# Patient Record
Sex: FEMALE | Race: WHITE | NOT HISPANIC OR LATINO | Employment: FULL TIME | URBAN - METROPOLITAN AREA
[De-identification: names, ages, dates, MRNs, and addresses within clinical notes are randomized per-mention and may not be internally consistent; named-entity substitution may affect disease eponyms.]

---

## 2017-06-22 ENCOUNTER — CONVERSION ENCOUNTER (OUTPATIENT)
Dept: GENERAL RADIOLOGY | Facility: HOSPITAL | Age: 42
End: 2017-06-22

## 2018-02-06 ENCOUNTER — OFFICE VISIT CONVERTED (OUTPATIENT)
Dept: FAMILY MEDICINE CLINIC | Facility: CLINIC | Age: 43
End: 2018-02-06
Attending: NURSE PRACTITIONER

## 2018-05-22 ENCOUNTER — CONVERSION ENCOUNTER (OUTPATIENT)
Dept: FAMILY MEDICINE CLINIC | Facility: CLINIC | Age: 43
End: 2018-05-22

## 2018-05-22 ENCOUNTER — OFFICE VISIT CONVERTED (OUTPATIENT)
Dept: FAMILY MEDICINE CLINIC | Facility: CLINIC | Age: 43
End: 2018-05-22
Attending: NURSE PRACTITIONER

## 2018-09-20 ENCOUNTER — OFFICE VISIT CONVERTED (OUTPATIENT)
Dept: ORTHOPEDIC SURGERY | Facility: CLINIC | Age: 43
End: 2018-09-20
Attending: PHYSICIAN ASSISTANT

## 2018-10-03 ENCOUNTER — OFFICE VISIT CONVERTED (OUTPATIENT)
Dept: NEUROLOGY | Facility: CLINIC | Age: 43
End: 2018-10-03
Attending: PSYCHIATRY & NEUROLOGY

## 2018-10-10 ENCOUNTER — CONVERSION ENCOUNTER (OUTPATIENT)
Dept: OTHER | Facility: HOSPITAL | Age: 43
End: 2018-10-10

## 2018-10-10 ENCOUNTER — OFFICE VISIT CONVERTED (OUTPATIENT)
Dept: OTHER | Facility: HOSPITAL | Age: 43
End: 2018-10-10
Attending: ORTHOPAEDIC SURGERY

## 2018-10-29 ENCOUNTER — OFFICE VISIT CONVERTED (OUTPATIENT)
Dept: ORTHOPEDIC SURGERY | Facility: CLINIC | Age: 43
End: 2018-10-29
Attending: PHYSICIAN ASSISTANT

## 2018-11-13 ENCOUNTER — OFFICE VISIT CONVERTED (OUTPATIENT)
Dept: ORTHOPEDIC SURGERY | Facility: CLINIC | Age: 43
End: 2018-11-13
Attending: PHYSICIAN ASSISTANT

## 2018-11-19 ENCOUNTER — OFFICE VISIT CONVERTED (OUTPATIENT)
Dept: FAMILY MEDICINE CLINIC | Facility: CLINIC | Age: 43
End: 2018-11-19
Attending: NURSE PRACTITIONER

## 2018-12-13 ENCOUNTER — OFFICE VISIT CONVERTED (OUTPATIENT)
Dept: ORTHOPEDIC SURGERY | Facility: CLINIC | Age: 43
End: 2018-12-13
Attending: PHYSICIAN ASSISTANT

## 2019-01-15 ENCOUNTER — OFFICE VISIT CONVERTED (OUTPATIENT)
Dept: ORTHOPEDIC SURGERY | Facility: CLINIC | Age: 44
End: 2019-01-15
Attending: ORTHOPAEDIC SURGERY

## 2019-01-15 ENCOUNTER — CONVERSION ENCOUNTER (OUTPATIENT)
Dept: ORTHOPEDIC SURGERY | Facility: CLINIC | Age: 44
End: 2019-01-15

## 2019-01-29 ENCOUNTER — OFFICE VISIT CONVERTED (OUTPATIENT)
Dept: FAMILY MEDICINE CLINIC | Facility: CLINIC | Age: 44
End: 2019-01-29
Attending: NURSE PRACTITIONER

## 2019-04-19 ENCOUNTER — CONVERSION ENCOUNTER (OUTPATIENT)
Dept: FAMILY MEDICINE CLINIC | Facility: CLINIC | Age: 44
End: 2019-04-19

## 2019-04-19 ENCOUNTER — OFFICE VISIT CONVERTED (OUTPATIENT)
Dept: FAMILY MEDICINE CLINIC | Facility: CLINIC | Age: 44
End: 2019-04-19
Attending: NURSE PRACTITIONER

## 2019-05-03 ENCOUNTER — CONVERSION ENCOUNTER (OUTPATIENT)
Dept: FAMILY MEDICINE CLINIC | Facility: CLINIC | Age: 44
End: 2019-05-03
Attending: NURSE PRACTITIONER

## 2019-05-03 ENCOUNTER — HOSPITAL ENCOUNTER (OUTPATIENT)
Dept: GENERAL RADIOLOGY | Facility: HOSPITAL | Age: 44
Discharge: HOME | End: 2019-05-03
Attending: NURSE PRACTITIONER

## 2019-05-17 ENCOUNTER — OFFICE VISIT CONVERTED (OUTPATIENT)
Dept: FAMILY MEDICINE CLINIC | Facility: CLINIC | Age: 44
End: 2019-05-17
Attending: NURSE PRACTITIONER

## 2019-07-12 ENCOUNTER — OFFICE VISIT CONVERTED (OUTPATIENT)
Dept: FAMILY MEDICINE CLINIC | Facility: CLINIC | Age: 44
End: 2019-07-12
Attending: NURSE PRACTITIONER

## 2019-07-12 ENCOUNTER — CONVERSION ENCOUNTER (OUTPATIENT)
Dept: FAMILY MEDICINE CLINIC | Facility: CLINIC | Age: 44
End: 2019-07-12

## 2019-08-14 ENCOUNTER — OFFICE VISIT CONVERTED (OUTPATIENT)
Dept: FAMILY MEDICINE CLINIC | Facility: CLINIC | Age: 44
End: 2019-08-14
Attending: NURSE PRACTITIONER

## 2019-08-23 ENCOUNTER — OFFICE VISIT CONVERTED (OUTPATIENT)
Dept: FAMILY MEDICINE CLINIC | Facility: CLINIC | Age: 44
End: 2019-08-23
Attending: NURSE PRACTITIONER

## 2020-03-25 ENCOUNTER — OFFICE VISIT CONVERTED (OUTPATIENT)
Dept: FAMILY MEDICINE CLINIC | Facility: CLINIC | Age: 45
End: 2020-03-25
Attending: NURSE PRACTITIONER

## 2020-05-06 ENCOUNTER — OFFICE VISIT CONVERTED (OUTPATIENT)
Dept: FAMILY MEDICINE CLINIC | Facility: CLINIC | Age: 45
End: 2020-05-06
Attending: NURSE PRACTITIONER

## 2020-05-06 ENCOUNTER — CONVERSION ENCOUNTER (OUTPATIENT)
Dept: FAMILY MEDICINE CLINIC | Facility: CLINIC | Age: 45
End: 2020-05-06

## 2020-07-20 ENCOUNTER — OFFICE VISIT CONVERTED (OUTPATIENT)
Dept: FAMILY MEDICINE CLINIC | Facility: CLINIC | Age: 45
End: 2020-07-20
Attending: NURSE PRACTITIONER

## 2020-08-01 ENCOUNTER — HOSPITAL ENCOUNTER (OUTPATIENT)
Dept: OTHER | Facility: HOSPITAL | Age: 45
Discharge: HOME | End: 2020-08-01
Attending: NURSE PRACTITIONER

## 2020-08-01 LAB
CRP SERPL-MCNC: 5.2 MG/L (ref 0–5)
ERYTHROCYTE [SEDIMENTATION RATE] IN BLOOD: 9 MM/H (ref 0–20)

## 2020-08-07 ENCOUNTER — OFFICE VISIT CONVERTED (OUTPATIENT)
Dept: FAMILY MEDICINE CLINIC | Facility: CLINIC | Age: 45
End: 2020-08-07
Attending: NURSE PRACTITIONER

## 2020-08-07 ENCOUNTER — CONVERSION ENCOUNTER (OUTPATIENT)
Dept: FAMILY MEDICINE CLINIC | Facility: CLINIC | Age: 45
End: 2020-08-07

## 2020-08-07 LAB
CONV RHEUMATOID FACTOR IGA: 3 UNITS (ref 0–6)
CONV RHEUMATOID FACTOR IGG: 5 UNITS (ref 0–6)
CONV RHEUMATOID FACTOR IGM: 4 UNITS (ref 0–6)

## 2020-08-21 ENCOUNTER — CONVERSION ENCOUNTER (OUTPATIENT)
Dept: FAMILY MEDICINE CLINIC | Facility: CLINIC | Age: 45
End: 2020-08-21

## 2020-08-21 ENCOUNTER — OFFICE VISIT CONVERTED (OUTPATIENT)
Dept: FAMILY MEDICINE CLINIC | Facility: CLINIC | Age: 45
End: 2020-08-21
Attending: NURSE PRACTITIONER

## 2021-05-13 NOTE — PROGRESS NOTES
Progress Note      Patient Name: Odessa Thomas   Patient ID: [id number]   Sex: Female   YOB: 1975    Primary Care Provider: Christian CHOWDHURY   Referring Provider: Christian CHOWDHURY    Visit Date: May 6, 2020    Provider: TRENTON Brady   Location: [facility]   Location Address: [address] [place] [address], [address]  [id number]   Location Phone: [phone]          Chief Complaint  · headache      History Of Present Illness  Odessa Thomas is a 44 year old /White female who presents for evaluation and treatment of:      Patient presents to the office today with complaints of frequent headaches.  She states that these headaches are different than her migraines as they feel as if they originate in her neck and work their way to the back of her head.  Patient does state that it feels as if there is a band around her head.  Patient does state that she has started a new job approximately 6 weeks ago and is having to learn a lot of new things and she is putting a lot of pressure on herself.  Patient states that she is working 8 to 10 hours a day and then going home and studying for multiple hours on the [employer].       Past Medical History  Arthropathy, unspecified; Bilateral carpal tunnel syndrome; Bilateral wrist pain; Carpal tunnel syndrome of left wrist; Carpal tunnel syndrome of right wrist; Cervical pain (neck); Cervicalgia; Cubital tunnel syndrome on left; Fatigue; Lateral epicondylitis of left elbow; Lumbago/low back pain; Migraine, unspecified; Pain management; Right knee pain; Screening Mammogram         Past Surgical History  Arm Surgery; Bladder Surgery; Carpal Tunnel Release; Cesarian Section; Partial Hysterectomy         Medication List  azelastine 137 mcg (0.1 %) nasal aerosol,spray; Florastor 250 mg oral capsule; metoprolol succinate 25 mg oral tablet extended release 24 hr; Neuropathic Compound Cream Apply 1-2 gm, 3-4 times a day; Synthroid 25 mcg oral  "tablet; Topamax 50 mg oral tablet; trazodone 50 mg oral tablet; Xyzal 5 mg oral tablet         Allergy List  Steroids         Family Medical History  Stroke; Rheumatoid arthritis; Heart Attack (MI)         Social History  Alcohol (Never); Tobacco (Current every day)         Review of Systems  · Constitutional  o Denies  o : fever, fatigue, weight loss, weight gain  · HENT  o Admits  o : headaches  · Cardiovascular  o Denies  o : lower extremity edema, claudication, chest pressure, palpitations  · Respiratory  o Denies  o : shortness of breath, wheezing, cough, hemoptysis, dyspnea on exertion  · Gastrointestinal  o Denies  o : nausea, vomiting, diarrhea, constipation, abdominal pain      Vitals  Date Time BP Position Site L\R Cuff Size HR RR TEMP (F) WT  HT  BMI kg/m2 BSA m2 O2 Sat HC       05/06/2020 02:07 /72 Sitting    114 - R 16 98 131lbs 0oz 5'  1\" 24.75 1.6 98 %          Physical Examination  · Constitutional  o Appearance  o : well-nourished, in no acute distress  · Neck  o Inspection/Palpation  o : normal appearance, no masses or tenderness, trachea midline  o Range of Motion  o : cervical range of motion within normal limits  o Thyroid  o : gland size normal, nontender, no nodules or masses present on palpation  · Respiratory  o Respiratory Effort  o : breathing unlabored  o Inspection of Chest  o : normal appearance  o Auscultation of Lungs  o : normal breath sounds throughout inspiration and expiration  · Cardiovascular  o Heart  o :   § Auscultation of Heart  § : regular rate and rhythm, no murmurs, gallops or rubs  o Peripheral Vascular System  o :   § Extremities  § : no clubbing or edema  · Skin and Subcutaneous Tissue  o General Inspection  o : no rashes or lesions present, no areas of discoloration  o Body Hair  o : hair normal for age, general body hair distribution normal for age  o Digits and Nails  o : no clubbing, cyanosis, deformities or edema present, normal appearing " nails  · Neurologic  o Mental Status Examination  o :   § Orientation  § : grossly oriented to person, place and time  o Gait and Station  o : normal gait, able to stand without difficulty  · Psychiatric  o Judgement and Insight  o : judgment and insight intact  o Mood and Affect  o : mood normal, affect appropriate  o Presence of Abnormal Thoughts  o : no hallucinations, no delusions present, no psychotic thoughts          Assessment  · Screening for depression     V79.0/Z13.89  · Anxiety disorder     300.00/F41.9  · Headache     784.0/R51      Plan  · Orders  o Annual depression screening, 15 minutes (, 34200) - V79.0/Z13.89 - 05/06/2020  o ACO-18: Negative screen for clinical depression using a standardized tool () - V79.0/Z13.89 - 05/06/2020  o ACO-39: Current medications updated and reviewed () - - 05/06/2020  o ACO-14: Influenza immunization was not administered for reasons documented () - - 05/06/2020  · Medications  o Wellbutrin  mg oral tablet extended release 24 hr   SIG: take 1 tablet (150 mg) by oral route once daily   DISP: (30) tablets with 5 refills  Prescribed on 05/06/2020     o Synthroid 25 mcg oral tablet   SIG: TAKE 1 TABLET BY MOUTH EVERY DAY   DISP: (30) Tablet with 5 refills  Discontinued on 05/06/2020     o Topamax 50 mg oral tablet   SIG: Take 0.5 tab PO qhs x 1 week then 1 tab PO BID thereafter   DISP: (60) tablets with 1 refills  Discontinued on 05/06/2020     o trazodone 50 mg oral tablet   SIG: TAKE 1 TABLET (50 MG) BY ORAL ROUTE ONCE DAILY AT BEDTIME   DISP: (30) Tablet with 2 refills  Discontinued on 05/06/2020     o Xyzal 5 mg oral tablet   SIG: take 1 tablet (5 mg) by oral route once daily at bedtime   DISP: (30) tablets with 0 refills  Discontinued on 05/06/2020     o Medications have been Reconciled  o Transition of Care or Provider Policy  · Instructions  o Depression Screen completed and scanned into the EMR under the designated folder within the  "patient's documents.  o Today's PHQ-9 result is ___9  o Discussed the need for therapy, either with a certified counselor, psychologist, and/or family . If no improvement is noted or worsening of their condition, return to office or ER. But also discussed with patient that if they are non-responsive to the type of medication they may need to see a psychiatrist for further evaluation and management.  o Patient agrees to a \"No Self Harm\" contract. Patient will either call , [facility], , [facility], [facility].  o Patient was educated/instructed on their diagnosis, treatment and medications prior to discharge from the clinic today.  o Time spent with the patient was minutes, more than 50% face to face.  o Electronically Identified Patient Education Materials Provided Electronically  · Disposition  o Call or Return if symptoms worsen or persist.  o Follow up as scheduled            Electronically Signed by: TRENTON Brady -Author on May 6, 2020 02:53:08 PM  "

## 2021-05-13 NOTE — PROGRESS NOTES
Progress Note      Patient Name: Odessa Thomas   Patient ID: [id number]   Sex: Female   YOB: 1975    Primary Care Provider: Christian CHOWDHURY   Referring Provider: Christian CHOWDHURY    Visit Date: August 21, 2020    Provider: TRENTON Brady   Location: [facility]   Location Address: [address] [place] [address], [address] [place] [address]  [id number]   Location Phone: [phone]          Chief Complaint  · Right shoulder pain     Patient complaining of right shoulder pain s/p falling down 10+ steps yesterday.  Patient thinks she may have stepped on her dog and lost her balance.  Patient denies LOC, bruising.       History Of Present Illness  Odessa Thomsa is a 44 year old /White female who presents for evaluation and treatment of:      Patient presents to the office today with continued right shoulder pain.  Patient states that she did trip down several stairs yesterday after tripping on her dog.  She states that the shoulder pain has increased and she has had reduced range of motion secondary to pain.  She denies any swelling or ecchymosis to the shoulder.  Patient states that the pain increases with stretching her arms out or trying to reach behind her.  She denies any paresthesias       Past Medical History  Disease Name Date Onset Notes   Arthropathy, unspecified --  --    Bilateral Carpal Tunnel Syndrome 10/10/2018 --    Bilateral wrist pain --  --    Carpal tunnel syndrome of left wrist 10/29/2018 --    Carpal tunnel syndrome of right wrist 08/02/2017 --    Cervical pain (neck) 05/19/2017 --    Cervicalgia --  --    Cubital tunnel syndrome on left 10/10/2018 --    Fatigue 05/19/2017 --    Lateral epicondylitis of left elbow 08/02/2017 --    Lumbago/low back pain --  --    Migraine, unspecified --  --    Pain management --  --    Right knee pain --  --    Screening Mammogram 5/2019 --          Past Surgical History  Procedure Name Date Notes   Arm Surgery --  --    Bladder Surgery  --  --    Carpal Tunnel Release --  --    Cesarian Section --  --    Partial Hysterectomy --  --          Medication List  Name Date Started Instructions   diclofenac potassium 50 mg oral tablet  take 1 tablet by oral route daily   Florastor 250 mg oral capsule 08/23/2019 take 1 capsule by oral route 2 times a day   metoprolol succinate 25 mg oral tablet extended release 24 hr 04/07/2020 TAKE 1 TABLET BY MOUTH AT BEDTIME   Voltaren 1 % topical gel 07/20/2020 apply 2 grams to the affected area(s) by topical route 2 times per day   Wellbutrin  mg oral tablet extended release 24 hr 05/06/2020 take 1 tablet (150 mg) by oral route once daily         Allergy List  Allergen Name Date Reaction Notes   Steroids --  Hives --          Family Medical History  Disease Name Relative/Age Notes   Stroke  --    Rheumatoid arthritis  --    Heart Attack (MI)  --          Social History  Finding Status Start/Stop Quantity Notes   Alcohol Never --/-- --  --    Tobacco Current every day 25/-- 1 ppd --          Immunizations  NameDate Admin Mfg Trade Name Lot Number Route Inj VIS Given VIS Publication   InfluenzaRefused 08/07/2020 NE Not Entered  NE NE     Comments:          Review of Systems  · Constitutional  o Denies  o : fatigue  · Eyes  o Denies  o : blurred vision, changes in vision  · HENT  o Denies  o : headaches  · Cardiovascular  o Denies  o : chest pain, irregular heart beats, rapid heart rate, dyspnea on exertion  · Respiratory  o Denies  o : shortness of breath, wheezing, cough  · Gastrointestinal  o Denies  o : nausea, vomiting, diarrhea, constipation, abdominal pain, blood in stools, melena  · Genitourinary  o Denies  o : frequency, dysuria, hematuria  · Integument  o Denies  o : rash, new skin lesions  · Musculoskeletal  o Admits  o : joint pain, shoulder pain, jaw pain  o Denies  o : joint swelling, muscle pain  · Endocrine  o Denies  o : polyuria, polydipsia      Vitals  Date Time BP Position Site L\R Cuff Size HR  "RR TEMP (F) WT  HT  BMI kg/m2 BSA m2 O2 Sat HC       08/07/2020 08:00 /68 Sitting    111 - R 18 98.3 131lbs 0oz 5'  1\" 24.75 1.6 95 %    08/07/2020 08:00 /68 Sitting    111 - R 18 98.3 131lbs 0oz 5'  1\" 24.75 1.6 95 %    08/21/2020 11:04 /54 Sitting    79 - R  98.1 128lbs 8oz 5'  1\" 24.28 1.58 95 %          Physical Examination  · Constitutional  o Appearance  o : well developed, well-nourished, no obvious deformities present  · Eyes  o Eyelids/Ocular Adnexae  o : eyelid appearance normal, no exudates present  · Respiratory  o Respiratory Effort  o : breathing unlabored  o Inspection of Chest  o : normal appearance  o Auscultation of Lungs  o : normal breath sounds throughout  · Cardiovascular  o Heart  o :   § Auscultation of Heart  § : regular rate and rhythm, no murmurs, gallops or rubs  o Peripheral Vascular System  o :   § Extremities  § : no cyanosis, clubbing or edema  · Skin and Subcutaneous Tissue  o General Inspection  o : no rashes or lesions present, no areas of discoloration  o Body Hair  o : hair normal for age, general body hair distribution normal for age  o Digits and Nails  o : no clubbing, cyanosis, deformities or edema present, normal appearing nails  · Neurologic  o Mental Status Examination  o :   § Orientation  § : grossly oriented to person, place and time  o Gait and Station  o : normal gait, able to stand without difficulty  · Psychiatric  o General  o : Appropriate mood and affect noted  o Mood and Affect  o : mood normal, affect appropriate  o Presence of Abnormal Thoughts  o : no hallucinations, no delusions present, no psychotic thoughts  · Right Shoulder  o Inspection  o : no abnormalities, redness, swelling, scarring or atrophy  o Palpation  o : tenderness to palpation, no crepitus  o Range of Motion  o : normal range of motion  o Neurovascular  o : neurovasularly intact including biceps and triceps reflex and distal pulses  o Strength  o : subscapularis, " infraspinatus, supraspinatus, and biceps strength all normal  o Stability  o : shoulder and rotator cuff stability intact          Assessment  · Shoulder pain, right     719.41/M25.511      Plan  · Orders  o ACO-39: Current medications updated and reviewed () - - 08/21/2020  o ACO-14: Influenza immunization was not administered for reasons documented () - - 08/21/2020   Patient refused  o MRI shoulder right wo contrast (47059) - 719.41/M25.511 - 08/21/2020  · Medications  o diclofenac sodium 75 mg oral tablet,delayed release (DR/EC)   SIG: take 1 tablet (75 mg) by oral route 2 times per day   DISP: (60) tablets with 1 refills  Prescribed on 08/21/2020     o diclofenac potassium 50 mg oral tablet   SIG: take 1 tablet by oral route daily   DISP: (0) tablet with 0 refills  Discontinued on 08/21/2020     o Medications have been Reconciled  o Transition of Care or Provider Policy  · Instructions  o Patient was educated/instructed on their diagnosis, treatment and medications prior to discharge from the clinic today.  o Time spent with the patient was minutes, more than 50% face to face.  o Electronically Identified Patient Education Materials Provided Electronically  · Disposition  o Call or Return if symptoms worsen or persist.            Electronically Signed by: TRENTON Brady -Author on August 21, 2020 12:11:18 PM

## 2021-05-13 NOTE — PROGRESS NOTES
Progress Note      Patient Name: Odessa Thomas   Patient ID: [id number]   Sex: Female   YOB: 1975    Primary Care Provider: Christian CHOWDHURY   Referring Provider: Christian CHOWDHURY    Visit Date: August 7, 2020    Provider: TRENTON Brady   [facility]   Location Address: [address] [place] [address], [address] [place] [address]  [id number]   Location Phone: [phone]          Chief Complaint  · Note for work for IBS  · Heavy feeling on chest and SOB for over a year      History Of Present Illness  Odessa Thomas is a 44 year old /White female who presents for evaluation and treatment of:      Patient presents to the office today to discuss her shortness of breath.  She states that this is been going on for about a year and a half and is concerned about asthma.  She states that he is not currently using any.  O2 saturations are 95% on room air at this time.  Patient also states that she has concerns about going to work and potentially kate COVID-19 and bring that back to her mother who is elderly with pulmonary fibrosis.    Patient also states that her employer is requiring a note regarding her IBS.  She states that during days that she has exacerbations she may have to go to the bathroom more frequently and this is causing concerns at work.    Patient also states that she continues to have polyarthralgia.  She was giving a requisition for a RA profile but this has not been completed.       Past Medical History  Disease Name Date Onset Notes   Arthropathy, unspecified --  --    Bilateral Carpal Tunnel Syndrome 10/10/2018 --    Bilateral wrist pain --  --    Carpal tunnel syndrome of left wrist 10/29/2018 --    Carpal tunnel syndrome of right wrist 08/02/2017 --    Cervical pain (neck) 05/19/2017 --    Cervicalgia --  --    Cubital tunnel syndrome on left 10/10/2018 --    Fatigue 05/19/2017 --    Lateral epicondylitis of left elbow 08/02/2017 --    Lumbago/low back pain  --  --    Migraine, unspecified --  --    Pain management --  --    Right knee pain --  --    Screening Mammogram 5/2019 --          Past Surgical History  Procedure Name Date Notes   Arm Surgery --  --    Bladder Surgery --  --    Carpal Tunnel Release --  --    Cesarian Section --  --    Partial Hysterectomy --  --          Medication List  Name Date Started Instructions   Florastor 250 mg oral capsule 08/23/2019 take 1 capsule by oral route 2 times a day   metoprolol succinate 25 mg oral tablet extended release 24 hr 04/07/2020 TAKE 1 TABLET BY MOUTH AT BEDTIME   Voltaren 1 % topical gel 07/20/2020 apply 2 grams to the affected area(s) by topical route 2 times per day   Wellbutrin  mg oral tablet extended release 24 hr 05/06/2020 take 1 tablet (150 mg) by oral route once daily         Allergy List  Allergen Name Date Reaction Notes   Steroids --  Hives --        Allergies Reconciled  Family Medical History  Disease Name Relative/Age Notes   Stroke  --    Rheumatoid arthritis  --    Heart Attack (MI)  --          Social History  Finding Status Start/Stop Quantity Notes   Alcohol Never --/-- --  --    Tobacco Current every day --/-- 1/2 to1 ppd --          Review of Systems  · Constitutional  o Denies  o : fatigue, night sweats  · Eyes  o Denies  o : double vision, blurred vision  · HENT  o Denies  o : vertigo, recent head injury  · Breasts  o Denies  o : abnormal changes in breast size, additional breast symptoms except as noted in the HPI  · Cardiovascular  o Denies  o : chest pain, irregular heart beats  · Respiratory  o Admits  o : shortness of breath  o Denies  o : productive cough  · Gastrointestinal  o Denies  o : nausea, vomiting  · Genitourinary  o Denies  o : dysuria, urinary retention  · Integument  o Denies  o : hair growth change, new skin lesions  · Neurologic  o Denies  o : altered mental status, seizures  · Musculoskeletal  o Denies  o : joint swelling, limitation of  "motion  · Endocrine  o Denies  o : cold intolerance, heat intolerance  · Heme-Lymph  o Denies  o : petechiae, lymph node enlargement or tenderness  · Allergic-Immunologic  o Denies  o : frequent illnesses      Vitals  Date Time BP Position Site L\R Cuff Size HR RR TEMP (F) WT  HT  BMI kg/m2 BSA m2 O2 Sat HC       08/07/2020 08:00 /68 Sitting    111 - R 18 98.3 131lbs 0oz 5'  1\" 24.75 1.6 95 %    08/07/2020 08:00 /68 Sitting    111 - R 18 98.3 131lbs 0oz 5'  1\" 24.75 1.6 95 %          Physical Examination  · Constitutional  o Appearance  o : well-nourished, in no acute distress  · Neck  o Inspection/Palpation  o : normal appearance, no masses or tenderness, trachea midline  o Range of Motion  o : cervical range of motion within normal limits  o Thyroid  o : gland size normal, nontender, no nodules or masses present on palpation, symmetric  · Respiratory  o Respiratory Effort  o : breathing unlabored  o Inspection of Chest  o : normal appearance  o Auscultation of Lungs  o : normal breath sounds throughout inspiration and expiration  · Cardiovascular  o Heart  o :   § Auscultation of Heart  § : regular rate and rhythm, no murmurs, gallops or rubs  o Peripheral Vascular System  o :   § Extremities  § : no clubbing or edema  · Skin and Subcutaneous Tissue  o General Inspection  o : no rashes or lesions present, no areas of discoloration  o Body Hair  o : hair normal for age, general body hair distribution normal for age  o Digits and Nails  o : no clubbing, cyanosis, deformities or edema present, normal appearing nails  · Neurologic  o Mental Status Examination  o :   § Orientation  § : grossly oriented to person, place and time  o Gait and Station  o : normal gait, able to stand without difficulty  · Psychiatric  o Judgement and Insight  o : judgment and insight intact  o Mood and Affect  o : mood normal, affect appropriate  o Presence of Abnormal Thoughts  o : no hallucinations, no delusions present, no " psychotic thoughts              Assessment  · Visit for screening mammogram     V76.12/Z12.31  · Nicotine dependence     305.1/F17.200  · Polyarthralgia     719.49/M25.50  · Shortness of breath     786.05/R06.02  · IBS (irritable bowel syndrome)     564.1/K58.9      Plan  · Orders  o Screening Mammography; Bilateral 3D (18759, 55183, ) - V76.12/Z12.31 - 08/09/2020  o ACO-17: Screened for tobacco use AND received tobacco cessation intervention (4004F) - 305.1/F17.200 - 08/07/2020  o ACO-39: Current medications updated and reviewed () - - 08/07/2020  o ACO-14: Influenza immunization was not administered for reasons documented () - - 08/07/2020  o PFT Pre and Post Bronchodilator [facility] (53127) - - 08/07/2020  o RA Profile 1 (Uric Acid, ASO, RF IgM, TANJA, CRP) [facility] (07860, 70959, 58908, 82947, 68111) - - 08/08/2020  o [facility] NCOV2 (send-out) (25170) - 786.05/R06.02 - 08/07/2020  · Medications  o azelastine 137 mcg (0.1 %) nasal aerosol,spray   SIG: spray 2 sprays in each nostril by intranasal route 2 times per day   DISP: (1) 30 ml squeez btl with 0 refills  Discontinued on 08/07/2020     o Medications have been Reconciled  o Transition of Care or Provider Policy  · Instructions  o *Form of nicotine being used:   o Patient was strongly encouraged to discontinue use of any nicotine containing product or minimize the use of the product.  o Patient was educated/instructed on their diagnosis, treatment and medications prior to discharge from the clinic today.  o Time spent with the patient was minutes, more than 50% face to face.  o Electronically Identified Patient Education Materials Provided Electronically  · Disposition  o Call or Return if symptoms worsen or persist.  o Follow up as scheduled            Electronically Signed by: TRENTON Brady -Author on August 7, 2020 10:12:36 AM

## 2021-05-13 NOTE — PROGRESS NOTES
Progress Note      Patient Name: Odessa Thomas   Patient ID: [id number]   Sex: Female   YOB: 1975    Primary Care Provider: Christian CHOWDHURY   Referring Provider: Christian CHOWDHURY    Visit Date: July 20, 2020    Provider: TRENTON Cornelius   Location: [facility]   Location Address: [address] [place] [address], [address] [place] [address]  [id number]   Location Phone: [phone]          Chief Complaint  · shoulder pain  · right shoulder pain and knot x 1 week. Decreased ROM. Taking arthritis meds and used heat and ice  · right ear pain that radiated into neck. Working on ENT appt      History Of Present Illness  Odessa Thomas is a 44 year old /White female who presents for evaluation and treatment of: pt having pain in R shoulder for a week. she had carried a heavy box of liters of cokes and it started hurting. she has used ice, heat, OTC arthritis med.       Past Medical History  Disease Name Date Onset Notes   Arthropathy, unspecified --  --    Bilateral Carpal Tunnel Syndrome 10/10/2018 --    Bilateral wrist pain --  --    Carpal tunnel syndrome of left wrist 10/29/2018 --    Carpal tunnel syndrome of right wrist 08/02/2017 --    Cervical pain (neck) 05/19/2017 --    Cervicalgia --  --    Cubital tunnel syndrome on left 10/10/2018 --    Fatigue 05/19/2017 --    Lateral epicondylitis of left elbow 08/02/2017 --    Lumbago/low back pain --  --    Migraine, unspecified --  --    Pain management --  --    Right knee pain --  --    Screening Mammogram 5/2019 --          Past Surgical History  Procedure Name Date Notes   Arm Surgery --  --    Bladder Surgery --  --    Carpal Tunnel Release --  --    Cesarian Section --  --    Partial Hysterectomy --  --          Medication List  Name Date Started Instructions   azelastine 137 mcg (0.1 %) nasal aerosol,spray 04/07/2020 spray 2 sprays in each nostril by intranasal route 2 times per day   Florastor 250 mg oral capsule 08/23/2019 take 1  capsule by oral route 2 times a day   metoprolol succinate 25 mg oral tablet extended release 24 hr 04/07/2020 TAKE 1 TABLET BY MOUTH AT BEDTIME   Wellbutrin  mg oral tablet extended release 24 hr 05/06/2020 take 1 tablet (150 mg) by oral route once daily         Allergy List  Allergen Name Date Reaction Notes   Steroids --  Hives --          Family Medical History  Disease Name Relative/Age Notes   Stroke  --    Rheumatoid arthritis  --    Heart Attack (MI)  --          Social History  Finding Status Start/Stop Quantity Notes   Alcohol Never --/-- --  --    Tobacco Current every day --/-- 1 ppd --          Review of Systems  · Constitutional  o Denies  o : fatigue, night sweats  · Eyes  o Denies  o : double vision, blurred vision  · HENT  o Admits  o : continues to have pain in her R ear. she's had this on and off, nothing on the inside of the ear its. the external ear  o Denies  o : vertigo, recent head injury  · Breasts  o Denies  o : abnormal changes in breast size, additional breast symptoms except as noted in the HPI  · Cardiovascular  o Denies  o : chest pain, irregular heart beats  · Respiratory  o Denies  o : shortness of breath, productive cough  · Gastrointestinal  o Denies  o : nausea, vomiting  · Genitourinary  o Denies  o : dysuria, urinary retention  · Integument  o Denies  o : hair growth change, new skin lesions  · Neurologic  o Denies  o : altered mental status, seizures  · Musculoskeletal  o Admits  o : pain with movement entire shoulder area. she has pain in her hands,knees and she thinks she has arthritis  o Denies  o : joint swelling  · Endocrine  o Denies  o : cold intolerance, heat intolerance  · Heme-Lymph  o Denies  o : petechiae, lymph node enlargement or tenderness  · Allergic-Immunologic  o Denies  o : frequent illnesses      Vitals  Date Time BP Position Site L\R Cuff Size HR RR TEMP (F) WT  HT  BMI kg/m2 BSA m2 O2 Sat HC       07/20/2020 10:49 /66 Sitting    97 - R 17 97.3  "130lbs 8oz 5'  1\" 24.66 1.6 98 %          Physical Examination  · Constitutional  o Appearance  o : well-nourished, well developed, alert, in no acute distress  · Ears, Nose, Mouth and Throat  o Ears  o : external ear auricle normal, otic canal normal,no redness or any lump felt on tragus  o Nose  o : external normal, nasal mucosa normal, turbinates normal  o Oral Cavity  o : tongue no lesion, oral mucosa normal  o Throat  o : no erythemia, exudate or lesions  · Neck  o Inspection/Palpation  o : normal appearance, no masses or tenderness, trachea midline, no enlarged cervical or supraclavicular lymphnodes palpated  o Thyroid  o : gland size normal, nontender, no nodules or masses present on palpation, thyroid motion normal during swallowing  · Respiratory  o Respiratory Effort  o : breathing unlabored  o Inspection of Chest  o : normal appearance, no retractions  o Auscultation of Lungs  o : normal breath sounds throughout  · Cardiovascular  o Heart  o :   § Auscultation of Heart  § : regular rate and rhythm without murmur  · Musculoskeletal  o General  o :   § General Musculoskeletal  § : No joint swelling or deformity., Muscle tone, strength, and development grossly normal.  o Right Upper Extremity  o :   § Inspection/Palpation  § : tenderness to palpation present, no edema present  § Range of Motion  § : range of motion mildly restricted, pain at shoulder with motion present   · Skin and Subcutaneous Tissue  o General Inspection  o : no rashes or lesions present, no areas of discoloration  · Neurologic  o Mental Status Examination  o : judgement, insight intact, modd and affect appropriate  o Motor Examination  o : strength grossly intact in all four extremities  o Gait and Station  o : normal gait, able to stand without difficulty          Assessment  · Shoulder pain, right     719.41/M25.511  · Arthritis     716.90/M19.90  · Knee pain, left     719.46/M25.562  · Hand pain, right     729.5/M79.641  · Hand pain, " left     729.5/M79.642      Plan  · Orders  o ACO-39: Current medications updated and reviewed () - - 07/20/2020  o Xray shoulder right [facility] ([id number]) - - 07/20/2020  o ESR (72149) - 716.90/M19.90 - 07/20/2020  o Rheumatoid Factor IgG, IgM, and IgA [facility] ([id number]) - 719.41/M25.511, 716.90/M19.90 - 07/20/2020  o CRP (Inflammation) [facility] ([id number]) - 716.90/M19.90 - 07/20/2020  · Medications  o Voltaren 1 % topical gel   SIG: apply 2 grams to the affected area(s) by topical route 2 times per day   DISP: (1) 100 gm tube with 0 refills  Prescribed on 07/20/2020     o clindamycin HCl 300 mg oral capsule   SIG: take 1 capsule (300 mg) by oral route 3 times per day for 7 days   DISP: (21) capsules with 0 refills  Discontinued on 07/20/2020     o Lidocaine Viscous 2 % mucous membrane solution   SIG: take 15 milliliters and swish and spit out by oral route every 3 hours   DISP: (2) 100 ml bottles with 0 refills  Discontinued on 07/20/2020     o Neuropathic Compound Cream Apply 1-2 gm, 3-4 times a day   SIG: Cream mixture: Ketamine 10%, Baclofen 2%, Cyclobenzaprine 2%, Diclofenac 3%, Gabapentin 10%, Bupivacaine 2% with DMSO   DISP: (1) tube with 5 refills  Discontinued on 07/20/2020     o Percocet 7.5-325 mg oral tablet   SIG: take 1 tablet by oral route every 6 hours as needed for 3 days as needed   DISP: (12) tablets with 0 refills  Discontinued on 07/20/2020     o Medications have been Reconciled  o Transition of Care or Provider Policy  · Instructions  o Take all medications as prescribed/directed.  o Patient was educated/instructed on their diagnosis, treatment and medications prior to discharge from the clinic today.  o has been referred to ENT for ongoing ear prob  · Disposition  o Call or Return if symptoms worsen or persist.            Electronically Signed by: Sarah Hernandes APRN -Author on July 20, 2020 11:12:39 AM

## 2021-05-14 VITALS
HEIGHT: 61 IN | BODY MASS INDEX: 24.26 KG/M2 | DIASTOLIC BLOOD PRESSURE: 54 MMHG | WEIGHT: 128.5 LBS | TEMPERATURE: 98.1 F | SYSTOLIC BLOOD PRESSURE: 107 MMHG | HEART RATE: 79 BPM | OXYGEN SATURATION: 95 %

## 2021-05-15 VITALS
HEIGHT: 61 IN | RESPIRATION RATE: 18 BRPM | WEIGHT: 131 LBS | DIASTOLIC BLOOD PRESSURE: 68 MMHG | TEMPERATURE: 98.3 F | HEART RATE: 111 BPM | OXYGEN SATURATION: 95 % | BODY MASS INDEX: 24.73 KG/M2 | SYSTOLIC BLOOD PRESSURE: 116 MMHG

## 2021-05-15 VITALS
DIASTOLIC BLOOD PRESSURE: 64 MMHG | BODY MASS INDEX: 24.17 KG/M2 | RESPIRATION RATE: 16 BRPM | TEMPERATURE: 96.9 F | OXYGEN SATURATION: 100 % | HEIGHT: 61 IN | HEART RATE: 84 BPM | WEIGHT: 128 LBS | SYSTOLIC BLOOD PRESSURE: 102 MMHG

## 2021-05-15 VITALS
BODY MASS INDEX: 24.17 KG/M2 | HEIGHT: 61 IN | DIASTOLIC BLOOD PRESSURE: 64 MMHG | WEIGHT: 128 LBS | RESPIRATION RATE: 16 BRPM | TEMPERATURE: 99.8 F | HEART RATE: 64 BPM | OXYGEN SATURATION: 100 % | SYSTOLIC BLOOD PRESSURE: 119 MMHG

## 2021-05-15 VITALS
TEMPERATURE: 97.1 F | DIASTOLIC BLOOD PRESSURE: 69 MMHG | WEIGHT: 131 LBS | BODY MASS INDEX: 24.73 KG/M2 | HEIGHT: 61 IN | RESPIRATION RATE: 16 BRPM | SYSTOLIC BLOOD PRESSURE: 108 MMHG | OXYGEN SATURATION: 96 % | HEART RATE: 89 BPM

## 2021-05-15 VITALS
DIASTOLIC BLOOD PRESSURE: 62 MMHG | HEART RATE: 73 BPM | OXYGEN SATURATION: 98 % | HEIGHT: 61 IN | RESPIRATION RATE: 16 BRPM | WEIGHT: 129 LBS | SYSTOLIC BLOOD PRESSURE: 100 MMHG | TEMPERATURE: 97.4 F | BODY MASS INDEX: 24.35 KG/M2

## 2021-05-15 VITALS
DIASTOLIC BLOOD PRESSURE: 36 MMHG | HEIGHT: 61 IN | TEMPERATURE: 97.7 F | SYSTOLIC BLOOD PRESSURE: 99 MMHG | HEART RATE: 102 BPM | RESPIRATION RATE: 16 BRPM | OXYGEN SATURATION: 98 %

## 2021-05-15 VITALS
RESPIRATION RATE: 16 BRPM | OXYGEN SATURATION: 98 % | TEMPERATURE: 97.7 F | WEIGHT: 127 LBS | SYSTOLIC BLOOD PRESSURE: 100 MMHG | DIASTOLIC BLOOD PRESSURE: 62 MMHG | HEIGHT: 61 IN | BODY MASS INDEX: 23.98 KG/M2 | HEART RATE: 108 BPM

## 2021-05-15 VITALS
HEART RATE: 97 BPM | OXYGEN SATURATION: 98 % | BODY MASS INDEX: 24.64 KG/M2 | RESPIRATION RATE: 17 BRPM | HEIGHT: 61 IN | TEMPERATURE: 97.3 F | DIASTOLIC BLOOD PRESSURE: 66 MMHG | SYSTOLIC BLOOD PRESSURE: 110 MMHG | WEIGHT: 130.5 LBS

## 2021-05-15 VITALS
OXYGEN SATURATION: 98 % | TEMPERATURE: 98 F | DIASTOLIC BLOOD PRESSURE: 72 MMHG | HEIGHT: 61 IN | BODY MASS INDEX: 24.73 KG/M2 | SYSTOLIC BLOOD PRESSURE: 116 MMHG | WEIGHT: 131 LBS | RESPIRATION RATE: 16 BRPM | HEART RATE: 114 BPM

## 2021-05-15 VITALS
HEART RATE: 113 BPM | RESPIRATION RATE: 16 BRPM | BODY MASS INDEX: 23.79 KG/M2 | OXYGEN SATURATION: 100 % | SYSTOLIC BLOOD PRESSURE: 116 MMHG | HEIGHT: 61 IN | TEMPERATURE: 96.8 F | DIASTOLIC BLOOD PRESSURE: 64 MMHG | WEIGHT: 126 LBS

## 2021-05-15 VITALS — RESPIRATION RATE: 18 BRPM | WEIGHT: 122 LBS | HEIGHT: 61 IN | BODY MASS INDEX: 23.03 KG/M2

## 2021-05-15 VITALS — WEIGHT: 121 LBS | HEIGHT: 61 IN | BODY MASS INDEX: 22.84 KG/M2 | RESPIRATION RATE: 16 BRPM

## 2021-05-16 VITALS
RESPIRATION RATE: 16 BRPM | SYSTOLIC BLOOD PRESSURE: 118 MMHG | DIASTOLIC BLOOD PRESSURE: 51 MMHG | BODY MASS INDEX: 22.84 KG/M2 | TEMPERATURE: 97.5 F | WEIGHT: 121 LBS | HEART RATE: 98 BPM | OXYGEN SATURATION: 98 % | HEIGHT: 61 IN

## 2021-05-16 VITALS
SYSTOLIC BLOOD PRESSURE: 119 MMHG | TEMPERATURE: 97.9 F | RESPIRATION RATE: 16 BRPM | HEART RATE: 100 BPM | WEIGHT: 128 LBS | OXYGEN SATURATION: 93 % | DIASTOLIC BLOOD PRESSURE: 81 MMHG | BODY MASS INDEX: 24.17 KG/M2 | HEIGHT: 61 IN

## 2021-05-16 VITALS — HEIGHT: 61 IN | WEIGHT: 127 LBS | BODY MASS INDEX: 23.98 KG/M2 | RESPIRATION RATE: 16 BRPM

## 2021-05-16 VITALS
WEIGHT: 127 LBS | DIASTOLIC BLOOD PRESSURE: 52 MMHG | SYSTOLIC BLOOD PRESSURE: 109 MMHG | HEIGHT: 61 IN | TEMPERATURE: 97.3 F | RESPIRATION RATE: 16 BRPM | BODY MASS INDEX: 23.98 KG/M2 | OXYGEN SATURATION: 99 % | HEART RATE: 91 BPM

## 2021-05-16 VITALS — HEIGHT: 61 IN | WEIGHT: 127 LBS | RESPIRATION RATE: 16 BRPM | BODY MASS INDEX: 23.98 KG/M2

## 2021-05-16 VITALS
WEIGHT: 127.37 LBS | DIASTOLIC BLOOD PRESSURE: 68 MMHG | BODY MASS INDEX: 24.05 KG/M2 | HEIGHT: 61 IN | SYSTOLIC BLOOD PRESSURE: 108 MMHG

## 2021-05-16 VITALS — BODY MASS INDEX: 23.98 KG/M2 | HEIGHT: 61 IN | WEIGHT: 127 LBS | RESPIRATION RATE: 16 BRPM

## 2022-04-25 ENCOUNTER — TELEPHONE (OUTPATIENT)
Dept: FAMILY MEDICINE CLINIC | Facility: CLINIC | Age: 47
End: 2022-04-25

## 2022-04-25 NOTE — TELEPHONE ENCOUNTER
Caller: Odessa Thomas    Relationship to patient: Self    Best call back number: [phone]    Chief complaint: RIGHT ELBOW PAIN    Type of visit: OFFICE    Requested date: ASA BETWEEN 8AM-10AM    Additional notes: THE PATIENT WOULD LIKE A CALL BACK TO SCHEDULE WITH PCP ONLY.

## 2022-11-23 ENCOUNTER — TELEPHONE (OUTPATIENT)
Dept: FAMILY MEDICINE CLINIC | Facility: CLINIC | Age: 47
End: 2022-11-23

## 2022-12-13 ENCOUNTER — TELEPHONE (OUTPATIENT)
Dept: FAMILY MEDICINE CLINIC | Facility: CLINIC | Age: 47
End: 2022-12-13

## 2022-12-13 ENCOUNTER — OFFICE VISIT (OUTPATIENT)
Dept: FAMILY MEDICINE CLINIC | Facility: CLINIC | Age: 47
End: 2022-12-13

## 2022-12-13 VITALS
TEMPERATURE: 97.6 F | OXYGEN SATURATION: 96 % | BODY MASS INDEX: 25.49 KG/M2 | DIASTOLIC BLOOD PRESSURE: 62 MMHG | HEART RATE: 106 BPM | WEIGHT: 135 LBS | SYSTOLIC BLOOD PRESSURE: 110 MMHG | HEIGHT: 61 IN

## 2022-12-13 DIAGNOSIS — E03.9 HYPOTHYROIDISM, UNSPECIFIED TYPE: ICD-10-CM

## 2022-12-13 DIAGNOSIS — B37.9 YEAST INFECTION: ICD-10-CM

## 2022-12-13 DIAGNOSIS — Z00.00 WELL ADULT EXAM: Primary | ICD-10-CM

## 2022-12-13 DIAGNOSIS — I10 PRIMARY HYPERTENSION: ICD-10-CM

## 2022-12-13 DIAGNOSIS — E78.5 HYPERLIPIDEMIA, UNSPECIFIED HYPERLIPIDEMIA TYPE: ICD-10-CM

## 2022-12-13 DIAGNOSIS — R53.83 FATIGUE, UNSPECIFIED TYPE: ICD-10-CM

## 2022-12-13 DIAGNOSIS — R00.2 PALPITATION: ICD-10-CM

## 2022-12-13 DIAGNOSIS — Z12.31 ENCOUNTER FOR SCREENING MAMMOGRAM FOR MALIGNANT NEOPLASM OF BREAST: ICD-10-CM

## 2022-12-13 DIAGNOSIS — M25.50 POLYARTHRALGIA: ICD-10-CM

## 2022-12-13 DIAGNOSIS — L90.0 LICHEN SCLEROSUS: ICD-10-CM

## 2022-12-13 PROBLEM — R52 PAIN MANAGEMENT: Status: ACTIVE | Noted: 2022-12-13

## 2022-12-13 PROBLEM — M25.531 BILATERAL WRIST PAIN: Status: ACTIVE | Noted: 2022-12-13

## 2022-12-13 PROBLEM — G56.22 CUBITAL TUNNEL SYNDROME ON LEFT: Status: ACTIVE | Noted: 2018-10-10

## 2022-12-13 PROBLEM — M54.2 NECK PAIN: Status: ACTIVE | Noted: 2022-12-13

## 2022-12-13 PROBLEM — C53.9: Status: ACTIVE | Noted: 2022-12-13

## 2022-12-13 PROBLEM — G43.909 MIGRAINE: Status: ACTIVE | Noted: 2022-12-13

## 2022-12-13 PROBLEM — M65.311 TRIGGER THUMB, RIGHT THUMB: Status: ACTIVE | Noted: 2022-06-17

## 2022-12-13 PROBLEM — M65.312 TRIGGER THUMB OF LEFT HAND: Status: ACTIVE | Noted: 2022-08-03

## 2022-12-13 PROBLEM — K21.9 GASTROESOPHAGEAL REFLUX DISEASE: Status: ACTIVE | Noted: 2022-12-13

## 2022-12-13 PROBLEM — G56.00 CARPAL TUNNEL SYNDROME: Status: ACTIVE | Noted: 2017-08-02

## 2022-12-13 PROBLEM — M25.561 RIGHT KNEE PAIN: Status: ACTIVE | Noted: 2022-12-13

## 2022-12-13 PROBLEM — N39.0 URINARY TRACT INFECTION: Status: ACTIVE | Noted: 2022-12-13

## 2022-12-13 PROBLEM — M54.2 CERVICAL PAIN (NECK): Status: ACTIVE | Noted: 2017-05-19

## 2022-12-13 PROBLEM — M12.9 ARTHROPATHY, UNSPECIFIED: Status: ACTIVE | Noted: 2022-12-13

## 2022-12-13 PROBLEM — M25.532 BILATERAL WRIST PAIN: Status: ACTIVE | Noted: 2022-12-13

## 2022-12-13 PROBLEM — M77.11 LATERAL EPICONDYLITIS OF RIGHT ELBOW: Status: ACTIVE | Noted: 2017-08-02

## 2022-12-13 PROBLEM — J30.9 ALLERGIC RHINITIS: Status: ACTIVE | Noted: 2022-12-13

## 2022-12-13 PROCEDURE — 99396 PREV VISIT EST AGE 40-64: CPT | Performed by: NURSE PRACTITIONER

## 2022-12-13 RX ORDER — FLUCONAZOLE 150 MG/1
150 TABLET ORAL ONCE
Qty: 2 TABLET | Refills: 0 | Status: SHIPPED | OUTPATIENT
Start: 2022-12-13 | End: 2022-12-27

## 2022-12-13 RX ORDER — CLOBETASOL PROPIONATE 0.5 MG/G
1 CREAM TOPICAL 2 TIMES DAILY
Qty: 45 G | Refills: 1 | Status: SHIPPED | OUTPATIENT
Start: 2022-12-13 | End: 2023-03-17 | Stop reason: SDUPTHER

## 2022-12-13 RX ORDER — METOPROLOL SUCCINATE 25 MG/1
25 TABLET, EXTENDED RELEASE ORAL DAILY
Qty: 90 TABLET | Refills: 1 | Status: SHIPPED | OUTPATIENT
Start: 2022-12-13

## 2022-12-13 NOTE — TELEPHONE ENCOUNTER
Caller: Odessa Thomas    Relationship: Self    Best call back number: [phone]    What form or medical record are you requesting: WORK EXCUSE    Who is requesting this form or medical record from you: EMPLOYER    How would you like to receive the form or medical records (pick-up, mail, fax): PICK-UP  If pick-up, provide patient with address and location details    Timeframe paperwork needed: ASAP    Additional notes: THE PATIENT STATED PCP HAS HER GOING BACK TO WORK TOMORROW. SHE STATED SHE WILL BE UNABLE TO AND WOULD LIKE PCP TO EXTEND HER TIME OFF AND RETURN TO WORK ON 12/19/22

## 2022-12-13 NOTE — PROGRESS NOTES
"Chief Complaint  Annual Exam    Subjective         Odessa Thomas presents to [facility]  Patient presents to the office today for a wellness check.  She states that she is doing much better although she is suffering from lichen sclerosus.  She does state that she is using clobetasol but needs a refill of the medication.  She also has a history of palpitations.  She has been cleared through cardiology.  She was using metoprolol daily in the past which seemed to help with the palpitations.  She states that she would like to get a refill of this medication.  Does complain of generalized polyarthralgia.  She states that that she has multiple autoimmune disorders in her family and like to be checked for any that she may be suffering from.  She is due for mammogram.    She recently states that she had the flu.  She also states that she thinks that she has a yeast infection and would like to have the Diflucan.       Objective     Vitals:    12/13/22 0708   BP: 110/62   BP Location: Right arm   Patient Position: Sitting   Cuff Size: Adult   Pulse: 106   Temp: 97.6 °F (36.4 °C)   TempSrc: Temporal   SpO2: 96%   Weight: 61.2 kg (135 lb)   Height: 154.9 cm (61\")      Body mass index is 25.51 kg/m².    BMI is >= 25 and <30. (Overweight) The following options were offered after discussion;: nutrition counseling/recommendations        Physical Exam  Vitals reviewed.   Constitutional:       Appearance: Normal appearance.   HENT:      Head: Normocephalic and atraumatic.      Right Ear: External ear normal.      Left Ear: External ear normal.      Nose: Nose normal.      Mouth/Throat:      Mouth: Mucous membranes are moist.      Pharynx: Oropharynx is clear.   Eyes:      Extraocular Movements: Extraocular movements intact.      Conjunctiva/sclera: Conjunctivae normal.      Pupils: Pupils are equal, round, and reactive to light.   Cardiovascular:      Rate and Rhythm: Normal rate and regular " rhythm.      Pulses: Normal pulses.      Heart sounds: Normal heart sounds.   Pulmonary:      Effort: Pulmonary effort is normal.      Breath sounds: Normal breath sounds.   Musculoskeletal:         General: Normal range of motion.      Cervical back: Normal range of motion and neck supple.   Skin:     General: Skin is warm and dry.   Neurological:      General: No focal deficit present.      Mental Status: She is alert and oriented to person, place, and time.   Psychiatric:         Mood and Affect: Mood normal.         Behavior: Behavior normal.          Result Review :   The following data was reviewed by: TRENTON Brady on 12/13/2022:      Procedures    Assessment and Plan   Diagnoses and all orders for this visit:    1. Well adult exam (Primary)  -     CBC (No Diff); Future  -     Comprehensive Metabolic Panel; Future    2. Hyperlipidemia, unspecified hyperlipidemia type  -     Lipid Panel; Future  -     CBC (No Diff); Future  -     Comprehensive Metabolic Panel; Future    3. Primary hypertension  -     CBC (No Diff); Future  -     Comprehensive Metabolic Panel; Future    4. Hypothyroidism, unspecified type  -     CBC (No Diff); Future  -     Comprehensive Metabolic Panel; Future  -     TSH+Free T4; Future  -     Thyroid Peroxidase Antibody; Future    5. Fatigue, unspecified type  -     Vitamin D,25-Hydroxy; Future  -     CBC (No Diff); Future  -     Comprehensive Metabolic Panel; Future    6. Encounter for screening mammogram for malignant neoplasm of breast  -     Mammo Screening Digital Tomosynthesis Bilateral With CAD; Future    7. Polyarthralgia  -     Rheumatoid Arthritis (RA) Profile; Future    8. Yeast infection  -     fluconazole (Diflucan) 150 MG tablet; Take 1 tablet by mouth 1 (One) Time for 1 dose. May repeat in 4 days if needed  Dispense: 2 tablet; Refill: 0    9. Lichen sclerosus  -     clobetasol (TEMOVATE) 0.05 % cream; Apply 1 application topically to the appropriate area as directed 2 (Two)  Times a Day.  Dispense: 45 g; Refill: 1    10. Palpitation  -     metoprolol succinate XL (Toprol XL) 25 MG 24 hr tablet; Take 1 tablet by mouth Daily.  Dispense: 90 tablet; Refill: 1      Preventative counseling includes healthy diet and exercise      Follow Up   Return in about 6 months (around 6/13/2023) for Recheck.  Patient was given instructions and counseling regarding her condition or for health maintenance advice. Please see specific information pulled into the AVS if appropriate.

## 2022-12-14 ENCOUNTER — LAB (OUTPATIENT)
Dept: LAB | Facility: HOSPITAL | Age: 47
End: 2022-12-14

## 2022-12-14 DIAGNOSIS — E78.5 HYPERLIPIDEMIA, UNSPECIFIED HYPERLIPIDEMIA TYPE: ICD-10-CM

## 2022-12-14 DIAGNOSIS — M25.50 POLYARTHRALGIA: ICD-10-CM

## 2022-12-14 DIAGNOSIS — E03.9 HYPOTHYROIDISM, UNSPECIFIED TYPE: ICD-10-CM

## 2022-12-14 DIAGNOSIS — R53.83 FATIGUE, UNSPECIFIED TYPE: ICD-10-CM

## 2022-12-14 LAB
25(OH)D3 SERPL-MCNC: 17.8 NG/ML (ref 30–100)
CHOLEST SERPL-MCNC: 185 MG/DL (ref 0–200)
HDLC SERPL-MCNC: 43 MG/DL (ref 40–60)
LDLC SERPL CALC-MCNC: 126 MG/DL (ref 0–100)
LDLC/HDLC SERPL: 2.89 {RATIO}
TRIGL SERPL-MCNC: 88 MG/DL (ref 0–150)
VLDLC SERPL-MCNC: 16 MG/DL (ref 5–40)

## 2022-12-14 PROCEDURE — 86200 CCP ANTIBODY: CPT

## 2022-12-14 PROCEDURE — 36415 COLL VENOUS BLD VENIPUNCTURE: CPT

## 2022-12-14 PROCEDURE — 82306 VITAMIN D 25 HYDROXY: CPT

## 2022-12-14 PROCEDURE — 86431 RHEUMATOID FACTOR QUANT: CPT

## 2022-12-14 PROCEDURE — 86376 MICROSOMAL ANTIBODY EACH: CPT

## 2022-12-14 PROCEDURE — 80061 LIPID PANEL: CPT

## 2022-12-15 LAB
CCP IGA+IGG SERPL IA-ACNC: 0 UNITS (ref 0–19)
RHEUMATOID FACT SERPL-ACNC: 13 IU/ML
THYROPEROXIDASE AB SERPL-ACNC: 11 IU/ML (ref 0–34)

## 2022-12-16 ENCOUNTER — HOSPITAL ENCOUNTER (OUTPATIENT)
Dept: MAMMOGRAPHY | Facility: HOSPITAL | Age: 47
End: 2022-12-16

## 2022-12-16 ENCOUNTER — TELEPHONE (OUTPATIENT)
Dept: FAMILY MEDICINE CLINIC | Facility: CLINIC | Age: 47
End: 2022-12-16

## 2022-12-16 NOTE — TELEPHONE ENCOUNTER
Caller: Odessa Thomas    Relationship: Self    Best call back number: [phone]    Caller requesting test results:BLOOD WORK    What test was performed:     When was the test performed: TUESDAY    Where was the test performed: OFFICE     Additional notes:

## 2022-12-16 NOTE — TELEPHONE ENCOUNTER
The provider has not reviewed the labs that were completed. Upon them being reviewed, will call and update patient.

## 2022-12-22 DIAGNOSIS — B37.9 YEAST INFECTION: ICD-10-CM

## 2022-12-27 RX ORDER — FLUCONAZOLE 150 MG/1
TABLET ORAL
Qty: 2 TABLET | Refills: 0 | Status: SHIPPED | OUTPATIENT
Start: 2022-12-27 | End: 2022-12-29

## 2022-12-28 ENCOUNTER — OFFICE VISIT (OUTPATIENT)
Dept: OBSTETRICS AND GYNECOLOGY | Facility: CLINIC | Age: 47
End: 2022-12-28

## 2022-12-28 VITALS
DIASTOLIC BLOOD PRESSURE: 64 MMHG | HEIGHT: 61 IN | SYSTOLIC BLOOD PRESSURE: 116 MMHG | BODY MASS INDEX: 25.11 KG/M2 | WEIGHT: 133 LBS

## 2022-12-28 DIAGNOSIS — F17.200 TOBACCO USE DISORDER: ICD-10-CM

## 2022-12-28 DIAGNOSIS — N90.89 VULVAR LESION: ICD-10-CM

## 2022-12-28 DIAGNOSIS — Z12.72 SCREENING FOR VAGINAL CANCER: ICD-10-CM

## 2022-12-28 DIAGNOSIS — N76.2 ACUTE VULVITIS: Primary | ICD-10-CM

## 2022-12-28 DIAGNOSIS — F17.210 NICOTINE DEPENDENCE, CIGARETTES, UNCOMPLICATED: ICD-10-CM

## 2022-12-28 DIAGNOSIS — A59.01 TRICHOMONAL VAGINITIS: ICD-10-CM

## 2022-12-28 PROCEDURE — 99203 OFFICE O/P NEW LOW 30 MIN: CPT | Performed by: OBSTETRICS & GYNECOLOGY

## 2022-12-28 NOTE — PROGRESS NOTES
"Chief Complaint  New Gyn (Patient is here for having white spots on vaginal area with pain.)    Subjective        Odessa Thomas presents to [facility] [facility]  History of Present Illness  Patient is here for evaluation of gynecologic problem.  She reports about a month long history of vulvar and vaginal pain and discomfort.  She reports began roughly 4 weeks ago with what she thought was a UTI.  She reported external vulvar pain and burning when urinating.  She was initially treated with 2 rounds of antibiotics, first with Bactrim and then with cephalexin.  She said the antibiotics led to a yeast infection and she was treated with multiple courses of Diflucan.  She reports that despite these treatments her symptoms worsened and progressed to daily and persistent vulvar pain and burning.  She states that the discomfort then moved to the internal vagina.  She also reports presence of a discharge and some scant vaginal spotting, which she attributed to the vulvar irritation that she was experiencing.  She reports noticing white splotches on the external vulva.  She also reports a lesion just inside the vagina on the right side.  Patient reports that her mother and 2 of her sisters both have lichen sclerosis.  She states that her sister is a nurse and about 2 weeks ago gave her her own supply of clobetasol which she has started using now for about 2 weeks.  She reports some improvement in her symptoms since starting the clobetasol.  She has also been using vinegar sitz bath with also improvement in her symptoms.  She has also been taking over-the-counter phenazopyridine with some relief of her symptoms.  Patient had a hysterectomy about 10 years ago by Dr. Gideon Wylie.  Patient reports that the indication for the hysterectomy was endometriosis but also \"cervical cancer.\".  I asked that the patient truly had a diagnosis of cervical cancer if this was for cervical dysplasia/precancer.  The patient " states that she was told it was cancer but that no further follow-up was necessary.  Patient has not had a Pap smear since her hysterectomy.  Patient reports yearly mammograms.  She was recently scheduled for a mammogram by her primary care provider.  She is planning this in the near future.    Menstrual History:  OB History        3    Para        Term                AB        Living   2       SAB        IAB        Ectopic        Molar        Multiple        Live Births                    No LMP recorded. Patient has had a hysterectomy.     Past Medical History:   Diagnosis Date   • Arthropathy, unspecified    • Bilateral carpal tunnel syndrome 10/10/2018   • Bilateral wrist pain    • Carpal tunnel syndrome of left wrist 10/29/2018   • Carpal tunnel syndrome of right wrist 2017   • Cervical cancer (HCC)    • Cervical dysplasia    • Cervical pain (neck) 2017   • Cervicalgia    • Cubital tunnel syndrome on left 10/10/2018   • Fatigue 2017   • Lateral epicondylitis of left elbow 2017   • Lumbago     low back pain   • Migraine     unspecified   • Pain management    • Right knee pain      Past Surgical History:   Procedure Laterality Date   • CARPAL TUNNEL RELEASE     •  SECTION     • HYSTERECTOMY     • MID-URETHRAL SLING WITH CYSTOSCOPY      By Dr. Wylie   • OTHER SURGICAL HISTORY      arm surgery     Social History     Tobacco Use   • Smoking status: Every Day     Packs/day: 1.00     Types: Cigarettes   • Smokeless tobacco: Never   • Tobacco comments:     started at age 23   Vaping Use   • Vaping Use: Never used   Substance Use Topics   • Alcohol use: Never   • Drug use: Not Currently     Types: Opium, Marijuana       Family History   Problem Relation Age of Onset   • Kidney disease Mother    • Heart disease Mother    • Pulmonary fibrosis Mother    • Heart disease Father    • Chiari malformation Other    • Lichen planus Other    • Hashimoto's thyroiditis Other    •  "Raynaud syndrome Other        Current Outpatient Medications on File Prior to Visit   Medication Sig   • clobetasol (TEMOVATE) 0.05 % cream Apply 1 application topically to the appropriate area as directed 2 (Two) Times a Day.   • metoprolol succinate XL (Toprol XL) 25 MG 24 hr tablet Take 1 tablet by mouth Daily.     No current facility-administered medications on file prior to visit.       No Known Allergies     ROS:  Constitutional: No fevers, chills, sweats   Eye: No recent visual problems, denies blurry vision   HEENT: No ear pain, nasal congestion, sore throat, voice changes   Respiratory: No shortness of breath, cough, pain on breathing   Cardiovascular: No Chest pain, palpitations   Gastrointestinal: No nausea, vomiting, diarrhea, constipation   Genitourinary: No hematuria, dysuria, lesions on genitalia   Hema/Lymph: Negative for bruising, no edema   Endocrine: Negative for excessive thirst, excessive hunger, heat or cold intolerance   Musculoskeletal: No joint pain, muscle pain, decreased range of motion   Integumentary: No rash, pruritus, abrasions, lesions   Neurologic: No weakness, numbness, headaches   Psychiatric: No anxiety, depression, mood changes           Objective   Vital Signs:  /64   Ht 154.9 cm (61\")   Wt 60.3 kg (133 lb)   BMI 25.13 kg/m²   Estimated body mass index is 25.13 kg/m² as calculated from the following:    Height as of this encounter: 154.9 cm (61\").    Weight as of this encounter: 60.3 kg (133 lb).          Physical Exam   Gen: No acute distress, awake and oriented times three  Abdomen: soft, nontender, no masses or hernia, no hepatosplenomegaly, non distended, normoactive bowel sounds  Pelvic: Exam performed in the presence of a female chaperone  Patient has provided verbal consent to proceed with exam.  External genital exam: Patient with hypopigmentation spanning from the periclitoral forrest down to the perineum in a symmetrical fashion.  This involves labia minora the " "folds between the labia minora and the majora and onto the labia majora bilaterally.  These appear typical of lichen sclerosis.  Just inside the labia minora on the right side there are several small lesions with a fissure which also appears suggestive of lichen sclerosis.  Other differential would include herpetic lesions, vulvar/vaginal dysplasia, vulvar/vaginal cancer.  Biopsy is recommended.  Urethra: Normal meatus, no caruncle  Bladder: nontender  Vagina: Small amount of white discharge.  Cuff is intact.  There are no lesions.  Pap smear performed  Cervix: Surgically absent  Uterus: Surgically absent  Adnexa: No masses or tenderness  External anal exam: Normal appearance, no lesions or hemorrhoids  Rectal: Deferred  Psych: Good judgement and insight, normal affect and mood      Result Review :                Assessment and Plan   Diagnoses and all orders for this visit:    1. Acute vulvitis (Primary)  Findings on exam today suggestive of lichen sclerosus. Discussed intermittent use of topical potent steroid cream such as clobetasol to treat episodic flares. Discussed weekly use of topical low potency steroid such as 1% hydrocortisone for maintenance. Recommended offie vulvar biospy for defintiive diagnosis.  We do not have the supplies in this office location to perform a vulvar biopsy today.  I recommend the patient return tomorrow to our [place] location for vulvar biopsy.  Discussed the importance of tissue diagnosis in this situation.  Discussed potential for increased risk vulvar cancer later in life and stressed importance of routine yeary visual inspection.     2. Vulvar lesion  -     Cancel: HSV 1/2, PCR (Non-CSF) - Swab, Vulva  -     Herpes Simplex Virus (HSV) 1 & 2, NIK - Swab, Vulva  As above, vulvar biopsy is recommended.    3. Screening for vaginal cancer  -     IGP, Apt HPV,rfx 16 / 18,45  Patient is status post hysterectomy; however, the indication for hysterectomy at the time was \"cervical " "cancer.\"  If the patient truly had cervical cancer, she would need continued Pap smears.  What is more likely is a diagnosis of cervical dysplasia.  In this case, she would need continued Pap smears for at least 20 years following her hysterectomy.  I have discussed this with the patient today.  Unfortunately, we will be unable to obtain the records from her previous OB/GYN office as that doctor office has permanently closed.    4. Tobacco use disorder       5. Nicotine dependence, cigarettes, uncomplicated  The risk of tobacco use was discussed with the patient, including risks of cancer, COPD, heart disease, and stroke.   Also discussed risks specific to her medical conditions including history of cervical dysplasia and autoimmune disorders such as lichen sclerosis.  Full cessation from tobacco products was encouraged and assistance was offered. Patient is not ready to quit at this time. Greater than 3 mins was spent on tobacco cessation efforts.                Follow Up   Return in about 1 day (around 12/29/2022) for vulvar biopsy.  Patient was given instructions and counseling regarding her condition or for health maintenance advice. Please see specific information pulled into the AVS if appropriate.       "

## 2022-12-29 ENCOUNTER — PROCEDURE VISIT (OUTPATIENT)
Dept: OBSTETRICS AND GYNECOLOGY | Facility: CLINIC | Age: 47
End: 2022-12-29

## 2022-12-29 VITALS
HEART RATE: 97 BPM | HEIGHT: 60 IN | SYSTOLIC BLOOD PRESSURE: 116 MMHG | WEIGHT: 123 LBS | DIASTOLIC BLOOD PRESSURE: 69 MMHG | BODY MASS INDEX: 24.15 KG/M2

## 2022-12-29 DIAGNOSIS — N90.89 VULVAR LESION: Primary | ICD-10-CM

## 2022-12-29 PROCEDURE — 56605 BIOPSY OF VULVA/PERINEUM: CPT | Performed by: OBSTETRICS & GYNECOLOGY

## 2022-12-29 NOTE — PROGRESS NOTES
Procedure   Procedures    Vulvar Biopsy Procedure Note    Pre-operative Diagnosis: Vulvar lesion, right    Post-operative Diagnosis: same    Locations:right vulva    Indications: Vular lesion, suspect lichen sclerosus    Anesthesia: Lidocaine 1% with epinephrine     Procedure Details   History of allergy to iodine: no  Patient informed of the risks (including bleeding and infection) and benefits of the   procedure and Written informed consent obtained.    Topical lidocaine spray was applied to the area.  The lesion and surrounding area was given a sterile prep using betadyne and draped in the usual sterile fashion.  2 cc of 1% lidocaine with epinephrine was injected in the area to be biopsied.  Biopsy was obtained with a 3 mm round punch.  The disc was elevated and excised with scissors.  Pressure was held and silver nitrate was applied.  Excellent hemostasis was noted.  Suture was not necessary.  Sterile 4 x 4 gauze was placed.  The specimen was sent for pathologic examination. The patient tolerated the procedure well.    EBL: 5 ml    Findings:  Suspect lichen sclerosis    Condition:  Stable    Complications:  none.    Plan:  1. Instructed to keep the wound dry and covered for 24-48h and clean thereafter.  2. Warning signs of infection were reviewed.    3. Recommended that the patient use OTC analgesics as needed for pain.

## 2022-12-30 LAB
HSV1 DNA SPEC QL NAA+PROBE: NEGATIVE
HSV2 DNA SPEC QL NAA+PROBE: POSITIVE

## 2023-01-03 ENCOUNTER — TELEPHONE (OUTPATIENT)
Dept: OBSTETRICS AND GYNECOLOGY | Facility: CLINIC | Age: 48
End: 2023-01-03
Payer: COMMERCIAL

## 2023-01-03 ENCOUNTER — PATIENT ROUNDING (BHMG ONLY) (OUTPATIENT)
Dept: OBSTETRICS AND GYNECOLOGY | Facility: CLINIC | Age: 48
End: 2023-01-03
Payer: COMMERCIAL

## 2023-01-03 ENCOUNTER — PATIENT MESSAGE (OUTPATIENT)
Dept: OBSTETRICS AND GYNECOLOGY | Facility: CLINIC | Age: 48
End: 2023-01-03
Payer: COMMERCIAL

## 2023-01-03 DIAGNOSIS — A60.09 HERPES GENITALIS IN WOMEN: Primary | ICD-10-CM

## 2023-01-03 RX ORDER — VALACYCLOVIR HYDROCHLORIDE 500 MG/1
500 TABLET, FILM COATED ORAL 2 TIMES DAILY
Qty: 6 TABLET | Refills: 3 | Status: SHIPPED | OUTPATIENT
Start: 2023-01-03 | End: 2023-01-06

## 2023-01-03 NOTE — TELEPHONE ENCOUNTER
Discussed results with patient. Cultures consistent with genital herpes. Findings discussed with pt. Discussed disease process. Discussed transmission. Discussed potential effects on future pregnancies. Safe sex practices encouraged. Will start Valtrex empirically today. Will send in refills for episodic therapy. If recurrent episodes more than 4 more years, patient may wish for daily suppression.

## 2023-01-03 NOTE — TELEPHONE ENCOUNTER
I spoke with the patient.  What she meant by her message was could I see the \"levels\" of her positive HSV test.  I think she is referring to serology with antibody testing, which would have a quantitative result.    I explained that the HSV culture/swab is a qualitative (positive or negative) test and there are no levels assigned to it.  She verbalized understanding.

## 2023-01-03 NOTE — PROGRESS NOTES
My chart message has been sent to the patient for PAT[facility] R[facility]ING with [facility].

## 2023-01-03 NOTE — TELEPHONE ENCOUNTER
----- Message from Ronit Dawson RN sent at 1/3/2023  1:06 PM EST -----  Regarding: Question regarding HSV NIK  Contact: [phone]  My Chart. Seen 12/28/22 & procedure visit 12/29/22. You called and discussed herpes with her today, 1/3/22. Unsure what she means by you seeing the actual labels. Thank you.      ----- Message -----  From: Odessa Thomas  Sent: 1/3/2023  12:31 PM EST  To: [facility] Pool  Subject: Question regarding HSV NIK                       Can you see the actual labels. In my research it  talks about it. This is new to me so I am still trying to figure things out

## 2023-01-05 ENCOUNTER — LAB (OUTPATIENT)
Dept: LAB | Facility: HOSPITAL | Age: 48
End: 2023-01-05
Payer: COMMERCIAL

## 2023-01-05 DIAGNOSIS — R53.83 FATIGUE, UNSPECIFIED TYPE: ICD-10-CM

## 2023-01-05 DIAGNOSIS — I10 PRIMARY HYPERTENSION: ICD-10-CM

## 2023-01-05 DIAGNOSIS — E03.9 HYPOTHYROIDISM, UNSPECIFIED TYPE: ICD-10-CM

## 2023-01-05 DIAGNOSIS — E78.5 HYPERLIPIDEMIA, UNSPECIFIED HYPERLIPIDEMIA TYPE: ICD-10-CM

## 2023-01-05 DIAGNOSIS — Z00.00 WELL ADULT EXAM: ICD-10-CM

## 2023-01-05 LAB
ALBUMIN SERPL-MCNC: 4.7 G/DL (ref 3.5–5.2)
ALBUMIN/GLOB SERPL: 1.5 G/DL
ALP SERPL-CCNC: 87 U/L (ref 39–117)
ALT SERPL W P-5'-P-CCNC: 10 U/L (ref 1–33)
ANION GAP SERPL CALCULATED.3IONS-SCNC: 9.1 MMOL/L (ref 5–15)
AST SERPL-CCNC: 13 U/L (ref 1–32)
BILIRUB SERPL-MCNC: 0.2 MG/DL (ref 0–1.2)
BUN SERPL-MCNC: 10 MG/DL (ref 6–20)
BUN/CREAT SERPL: 12.7 (ref 7–25)
CALCIUM SPEC-SCNC: 9.6 MG/DL (ref 8.6–10.5)
CHLORIDE SERPL-SCNC: 99 MMOL/L (ref 98–107)
CO2 SERPL-SCNC: 28.9 MMOL/L (ref 22–29)
CREAT SERPL-MCNC: 0.79 MG/DL (ref 0.57–1)
DEPRECATED RDW RBC AUTO: 44 FL (ref 37–54)
EGFRCR SERPLBLD CKD-EPI 2021: 91.8 ML/MIN/1.73
ERYTHROCYTE [DISTWIDTH] IN BLOOD BY AUTOMATED COUNT: 13.4 % (ref 12.3–15.4)
GLOBULIN UR ELPH-MCNC: 3.1 GM/DL
GLUCOSE SERPL-MCNC: 97 MG/DL (ref 65–99)
HCT VFR BLD AUTO: 41.3 % (ref 34–46.6)
HGB BLD-MCNC: 13.8 G/DL (ref 12–15.9)
MCH RBC QN AUTO: 29.9 PG (ref 26.6–33)
MCHC RBC AUTO-ENTMCNC: 33.4 G/DL (ref 31.5–35.7)
MCV RBC AUTO: 89.6 FL (ref 79–97)
PLATELET # BLD AUTO: 319 10*3/MM3 (ref 140–450)
PMV BLD AUTO: 10 FL (ref 6–12)
POTASSIUM SERPL-SCNC: 3.1 MMOL/L (ref 3.5–5.2)
PROT SERPL-MCNC: 7.8 G/DL (ref 6–8.5)
RBC # BLD AUTO: 4.61 10*6/MM3 (ref 3.77–5.28)
SODIUM SERPL-SCNC: 137 MMOL/L (ref 136–145)
WBC NRBC COR # BLD: 8.58 10*3/MM3 (ref 3.4–10.8)

## 2023-01-05 PROCEDURE — 80050 GENERAL HEALTH PANEL: CPT

## 2023-01-05 PROCEDURE — 84439 ASSAY OF FREE THYROXINE: CPT

## 2023-01-05 PROCEDURE — 36415 COLL VENOUS BLD VENIPUNCTURE: CPT

## 2023-01-06 PROBLEM — F17.210 NICOTINE DEPENDENCE, CIGARETTES, UNCOMPLICATED: Status: ACTIVE | Noted: 2023-01-06

## 2023-01-06 LAB
T4 FREE SERPL-MCNC: 1.27 NG/DL (ref 0.93–1.7)
TSH SERPL DL<=0.05 MIU/L-ACNC: 1.93 UIU/ML (ref 0.27–4.2)

## 2023-01-09 ENCOUNTER — TELEPHONE (OUTPATIENT)
Dept: FAMILY MEDICINE CLINIC | Facility: CLINIC | Age: 48
End: 2023-01-09
Payer: COMMERCIAL

## 2023-01-09 ENCOUNTER — TELEPHONE (OUTPATIENT)
Dept: OBSTETRICS AND GYNECOLOGY | Facility: CLINIC | Age: 48
End: 2023-01-09
Payer: COMMERCIAL

## 2023-01-09 LAB
CYTOLOGIST CVX/VAG CYTO: NORMAL
CYTOLOGY CVX/VAG DOC CYTO: NORMAL
CYTOLOGY CVX/VAG DOC THIN PREP: NORMAL
DX ICD CODE: NORMAL
HIV 1 & 2 AB SER-IMP: NORMAL
HPV I/H RISK 4 DNA CVX QL PROBE+SIG AMP: NEGATIVE
Lab: NORMAL
OTHER STN SPEC: NORMAL
STAT OF ADQ CVX/VAG CYTO-IMP: NORMAL

## 2023-01-09 RX ORDER — METRONIDAZOLE 500 MG/1
2000 TABLET ORAL ONCE
Qty: 14 TABLET | Refills: 0 | Status: SHIPPED | OUTPATIENT
Start: 2023-01-09 | End: 2023-01-09

## 2023-01-09 NOTE — TELEPHONE ENCOUNTER
Caller: Odessa Thomas    Relationship: Self    Best call back number: [phone]    Caller requesting test results: SELF    What test was performed: LABS    When was the test performed: 01/05/2023    Where was the test performed: [facility]    Additional notes: PATIENT IS REQUESTING CALL REGARDING LAB RESULTS.

## 2023-01-09 NOTE — TELEPHONE ENCOUNTER
----- Message from Matthieu Wing MD sent at 1/9/2023  4:07 PM EST -----  Please notify the patient that the vulvar biopsy results were unremarkable and looked most consistent with changes associated with herpes, as was diagnosed last week.

## 2023-01-10 LAB
DX ICD CODE: NORMAL
DX ICD CODE: NORMAL
PATH REPORT.FINAL DX SPEC: NORMAL
PATH REPORT.GROSS SPEC: NORMAL
PATH REPORT.SITE OF ORIGIN SPEC: NORMAL
PATH REPORT.SUPPLEMENTAL REPORTS SPEC: NORMAL
PATHOLOGIST NAME: NORMAL
PAYMENT PROCEDURE: NORMAL

## 2023-02-08 ENCOUNTER — TELEMEDICINE - AUDIO (OUTPATIENT)
Dept: OBSTETRICS AND GYNECOLOGY | Facility: CLINIC | Age: 48
End: 2023-02-08
Payer: COMMERCIAL

## 2023-02-08 ENCOUNTER — TELEPHONE (OUTPATIENT)
Dept: OBSTETRICS AND GYNECOLOGY | Facility: CLINIC | Age: 48
End: 2023-02-08
Payer: COMMERCIAL

## 2023-02-08 ENCOUNTER — TELEPHONE (OUTPATIENT)
Dept: OBSTETRICS AND GYNECOLOGY | Facility: CLINIC | Age: 48
End: 2023-02-08

## 2023-02-08 DIAGNOSIS — A60.09 HERPES GENITALIS IN WOMEN: Primary | ICD-10-CM

## 2023-02-08 DIAGNOSIS — Z70.8 ENCOUNTER FOR SEXUALLY TRANSMITTED DISEASE COUNSELING: ICD-10-CM

## 2023-02-08 PROCEDURE — 99441 PR PHYS/QHP TELEPHONE EVALUATION 5-10 MIN: CPT | Performed by: OBSTETRICS & GYNECOLOGY

## 2023-02-08 NOTE — TELEPHONE ENCOUNTER
I spoke with the patient at her request.  Please see telephone note encounter for further information.

## 2023-02-08 NOTE — PROGRESS NOTES
"Patient called the office with concerns about her partner's testing.     She reports that her partner recently was tested for STDs and \"the whole panel was negative.\"  She was not exactly sure of the mode of testing (urine test, penile swab, blood test, etc.).     I explained that a penile swab or urine test for herpes in the absence of an active outbreak might be negative, but I would expect that serology for herpes would be positive.     If they truly believe that their partner is negative, they should be very careful about transmission.  Advised to use condoms during sexual activity.  Advised to avoid sexual activity during an outbreak.  He should also have regular screening at least once yearly.     I explained that the trichomonas evaluation would not be reliably done via blood.  This should be done via urethral swab or urine test.  If the urethral swab or urine test are negative for trichomonas, it is likely that he is truly negative.  Again discussed safe sex practices.     All questions answered for the patient.  She verbalized understanding.           I spent 10 minutes today on the care of this patient, including review of the chart and any pertinent prior imaging and labs, interview/exam of the patient, developing care plan, and counseling the patient on management options and excluding any time spent on other separate billable services such as performing procedures or test interpretation, if applicable.  "

## 2023-02-08 NOTE — TELEPHONE ENCOUNTER
----- Message from Ronit Dawson RN sent at 2/8/2023  4:20 PM EST -----  Regarding: Question  Contact: [phone]  My Chart. Seen 12/28/22, HSV 2 + and you tried to call her x 2. She is requesting to speak with you. [phone] confirmed as the best number. Unsure if you want to try calling again to answer question. Thank you.      ----- Message -----  From: Odessa Thomas  Sent: 2/8/2023   4:01 PM EST  To: [facility] Clinical Pool  Subject: Question                                         [phone]

## 2023-03-17 DIAGNOSIS — L90.0 LICHEN SCLEROSUS: ICD-10-CM

## 2023-03-17 RX ORDER — CLOBETASOL PROPIONATE 0.5 MG/G
1 CREAM TOPICAL 2 TIMES DAILY
Qty: 45 G | Refills: 1 | Status: SHIPPED | OUTPATIENT
Start: 2023-03-17

## 2023-03-20 ENCOUNTER — TELEPHONE (OUTPATIENT)
Dept: OBSTETRICS AND GYNECOLOGY | Facility: CLINIC | Age: 48
End: 2023-03-20
Payer: COMMERCIAL

## 2023-03-20 NOTE — TELEPHONE ENCOUNTER
----- Message from Ronit Dawson RN sent at 3/20/2023  2:01 PM EDT -----  Regarding: Meds  Contact: [phone]  My Chart. Telemedicine appt 2/8/23, 6 month f/u 6/15/23. States she does not have insurance yet and is hurting again. Requesting Rx for generic valtrex and cream be sent to [facility] pharmacy on [address]. Thank you.      ----- Message -----  From: Odessa Thomas  Sent: 3/17/2023   4:38 PM EDT  To: [facility]yl [facility] Clinical Pool  Subject: Meds                                             Can you refill my Meds again. Im hurting again. I do not have insurance yet. Can't come in to be seen. I need the Gen Valtrex and cream. Thanks. [facility] [address]

## 2023-03-20 NOTE — TELEPHONE ENCOUNTER
Left message for Odessa that Dr Wing said that another provider sent in Rx for the clobetasol to [facility] with a refill on 3/17/23 and he sent in an rx for Valtrex 1/23/23 with 3 refills. He wants to know if you have used all of the refills. If so, he would need to prescribe you valtrex to use as a suppression. Please either call Dr Wing back or send a My Chart message back, so that he knows how to proceed. Thank you.

## 2023-04-11 ENCOUNTER — TELEPHONE (OUTPATIENT)
Dept: OBSTETRICS AND GYNECOLOGY | Facility: CLINIC | Age: 48
End: 2023-04-11
Payer: COMMERCIAL

## 2023-04-11 NOTE — TELEPHONE ENCOUNTER
Pt is calling and requesting medication please be sent to pharmacy for 'frequent urination' asked pt if she believes she may have uti, pt denies and says she believes this is due to old age and cannot release/make it to bathroom in time before urinating. She does have an appt scheduled for 5/9/23 for 6 mo follow up     Please advise,   Thank you

## 2023-05-23 ENCOUNTER — TELEPHONE (OUTPATIENT)
Dept: FAMILY MEDICINE CLINIC | Facility: CLINIC | Age: 48
End: 2023-05-23

## 2023-05-23 NOTE — TELEPHONE ENCOUNTER
LVMTCB    Patient missed their appointment scheduled on 5/23/2023 with Christian Corbin.     Would patient like to be rescheduled?    No show letter sent to patient either via [employer] [facility] or mail.     HUB TO SHARE

## 2023-06-12 ENCOUNTER — TELEPHONE (OUTPATIENT)
Dept: OBSTETRICS AND GYNECOLOGY | Facility: CLINIC | Age: 48
End: 2023-06-12
Payer: COMMERCIAL

## 2023-06-19 DIAGNOSIS — M25.50 POLYARTHRALGIA: Primary | ICD-10-CM

## 2024-01-12 ENCOUNTER — TELEPHONE (OUTPATIENT)
Dept: FAMILY MEDICINE CLINIC | Facility: CLINIC | Age: 49
End: 2024-01-12
Payer: MEDICAID

## 2024-01-12 NOTE — TELEPHONE ENCOUNTER
Caller: Odessa CARVAJAL    Relationship: Self    Best call back number: [phone]    What is the medical concern/diagnosis: STOMACH PAIN AND VOMITING     What specialty or service is being requested: GASTROENTEROLOGY OR A SCOPE     What is the provider, practice or medical service name: [facility]     What is the office location: [address] [place] [address]    What is the office phone number: [phone]    Any additional details: PATIENT WAS OFFERED TO SCHEDULE AN APPOINTMENT BUT DECLINED STATING THIS REFERRAL NEEDS TO BE PUT IN TODAY 1.12.24. PLEASE CALL PATIENT ONCE COMPLETED.

## 2024-01-12 NOTE — TELEPHONE ENCOUNTER
Per dorian pt has to be seen in office for referral, hasn't been in clinic since 12/2022. Pt was made aware, and sched an apt for weds Jan 17th.

## 2024-01-17 ENCOUNTER — OFFICE VISIT (OUTPATIENT)
Dept: FAMILY MEDICINE CLINIC | Facility: CLINIC | Age: 49
End: 2024-01-17
Payer: MEDICAID

## 2024-01-17 VITALS
HEART RATE: 124 BPM | SYSTOLIC BLOOD PRESSURE: 124 MMHG | DIASTOLIC BLOOD PRESSURE: 70 MMHG | WEIGHT: 125.2 LBS | TEMPERATURE: 97 F | OXYGEN SATURATION: 97 % | HEIGHT: 60 IN | BODY MASS INDEX: 24.58 KG/M2

## 2024-01-17 DIAGNOSIS — R10.84 GENERALIZED ABDOMINAL PAIN: Primary | ICD-10-CM

## 2024-01-17 DIAGNOSIS — Z12.31 ENCOUNTER FOR SCREENING MAMMOGRAM FOR MALIGNANT NEOPLASM OF BREAST: ICD-10-CM

## 2024-01-17 DIAGNOSIS — K21.9 GASTROESOPHAGEAL REFLUX DISEASE WITHOUT ESOPHAGITIS: ICD-10-CM

## 2024-01-17 RX ORDER — PANTOPRAZOLE SODIUM 40 MG/1
1 TABLET, DELAYED RELEASE ORAL DAILY
COMMUNITY
Start: 2024-01-05

## 2024-01-17 RX ORDER — ONDANSETRON 4 MG/1
TABLET, ORALLY DISINTEGRATING ORAL
COMMUNITY
Start: 2024-01-05

## 2024-01-17 RX ORDER — SUCRALFATE 1 G/1
1 TABLET ORAL EVERY 12 HOURS SCHEDULED
COMMUNITY
Start: 2024-01-05

## 2024-01-17 NOTE — PROGRESS NOTES
"Chief Complaint  Abdominal Pain    Subjective         Odessa HALL presents to [facility]  Patient presents to the office for a follow-up regarding her generalized abdominal pain.  Patient states that she was on her way home from a vacation in the [facility].  She states that she began getting sick and having nausea vomiting.  She states that she was being generalized abdominal pain and not eating much.  She states that she was having a bowel movement approximately every 3 to 4 days.  She states that when she got home she was seen at [facility] emergency department.  Labs were completed which were unremarkable.  She was started on Carafate as well as PPI.  She states that she is doing much better although she would like a referral to gastroenterology.  She states that does not feel 100% and feels as if something else is wrong.  Patient does request a gastroenterologist near [facility] along [facility].    Patient also states that she is due for her mammogram.  Explained that we would order this to get this scheduled at her convenience    Abdominal Pain         Objective     Vitals:    01/17/24 1325   BP: 124/70   BP Location: Right arm   Patient Position: Sitting   Cuff Size: Adult   Pulse: (!) 124   Temp: 97 °F (36.1 °C)   TempSrc: Temporal   SpO2: 97%   Weight: 56.8 kg (125 lb 3.2 oz)   Height: 152.4 cm (60\")      Body mass index is 24.45 kg/m².    BMI is within normal parameters. No other follow-up for BMI required.        Physical Exam  Vitals reviewed.   Constitutional:       Appearance: Normal appearance.   Cardiovascular:      Rate and Rhythm: Normal rate and regular rhythm.      Pulses: Normal pulses.      Heart sounds: Normal heart sounds, S1 normal and S2 normal. No murmur heard.  Pulmonary:      Effort: Pulmonary effort is normal. No respiratory distress.      Breath sounds: Normal breath sounds.   Abdominal:      General: Bowel sounds are normal.     "  Palpations: Abdomen is soft.      Tenderness:  in the epigastric area   Skin:     General: Skin is warm and dry.   Neurological:      Mental Status: She is alert and oriented to person, place, and time.   Psychiatric:         Attention and Perception: Attention normal.         Mood and Affect: Mood normal.         Behavior: Behavior normal.          Result Review :   The following data was reviewed by: TRENTON Brady on 01/17/2024:      Procedures    Assessment and Plan   Diagnoses and all orders for this visit:    1. Generalized abdominal pain (Primary)  -     Ambulatory Referral to Gastroenterology    2. Gastroesophageal reflux disease without esophagitis  -     Ambulatory Referral to Gastroenterology    3. Encounter for screening mammogram for malignant neoplasm of breast  -     Mammo Screening Digital Tomosynthesis Bilateral With CAD; Future          Follow Up   Return if symptoms worsen or fail to improve.  Patient was given instructions and counseling regarding her condition or for health maintenance advice. Please see specific information pulled into the AVS if appropriate.

## 2024-02-19 ENCOUNTER — TELEPHONE (OUTPATIENT)
Dept: OBSTETRICS AND GYNECOLOGY | Facility: CLINIC | Age: 49
End: 2024-02-19
Payer: MEDICAID

## 2024-02-19 DIAGNOSIS — B37.31 CANDIDAL VULVOVAGINITIS: Primary | ICD-10-CM

## 2024-02-19 DIAGNOSIS — A60.09 HERPES GENITALIS IN WOMEN: ICD-10-CM

## 2024-02-19 RX ORDER — VALACYCLOVIR HYDROCHLORIDE 500 MG/1
TABLET, FILM COATED ORAL
Qty: 6 TABLET | Refills: 2 | Status: SHIPPED | OUTPATIENT
Start: 2024-02-19

## 2024-02-19 RX ORDER — FLUCONAZOLE 150 MG/1
150 TABLET ORAL
Qty: 2 TABLET | Refills: 0 | Status: SHIPPED | OUTPATIENT
Start: 2024-02-19 | End: 2024-02-23

## 2024-02-21 ENCOUNTER — TELEPHONE (OUTPATIENT)
Dept: FAMILY MEDICINE CLINIC | Facility: CLINIC | Age: 49
End: 2024-02-21
Payer: MEDICAID

## 2024-02-21 NOTE — TELEPHONE ENCOUNTER
Spoke with patient and advised we do not have results of her colonoscopy yet and she would need to speak to Dr. Mock regarding next steps - she states that she was diagnosed with Barretts Esophagus, Hiatal hernia  and hemorrhoids.

## 2024-02-21 NOTE — TELEPHONE ENCOUNTER
Caller: Odessa HALL    Relationship: Self    Best call back number: [phone]     Caller requesting test results: TEST    What test was performed: COLONOSCOPY AND SCOPE     When was the test performed: 2.20.24    Where was the test performed: [facility]     Additional notes: PATIENT ASKS FOR TRENTON OLIVAREZ TO CALL HER TO GO OVER THE RESULTS BECAUSE A FEW THINGS WERE FOUND WHEN IT WAS DONE. PATIENT INQUIRES ABOUT A PLAN OF CARE.

## 2024-02-24 ENCOUNTER — HOSPITAL ENCOUNTER (OUTPATIENT)
Dept: MAMMOGRAPHY | Facility: HOSPITAL | Age: 49
Discharge: HOME | End: 2024-02-24
Admitting: NURSE PRACTITIONER
Payer: MEDICAID

## 2024-02-24 DIAGNOSIS — Z12.31 ENCOUNTER FOR SCREENING MAMMOGRAM FOR MALIGNANT NEOPLASM OF BREAST: ICD-10-CM

## 2024-02-24 PROCEDURE — 77063 BREAST TOMOSYNTHESIS BI: CPT

## 2024-02-24 PROCEDURE — 77067 SCR MAMMO BI INCL CAD: CPT

## 2024-02-26 DIAGNOSIS — R92.8 ABNORMAL MAMMOGRAM: Primary | ICD-10-CM

## 2024-02-29 ENCOUNTER — TELEPHONE (OUTPATIENT)
Dept: FAMILY MEDICINE CLINIC | Facility: CLINIC | Age: 49
End: 2024-02-29
Payer: MEDICAID

## 2024-02-29 NOTE — TELEPHONE ENCOUNTER
Caller: Odessa HALL    Relationship to patient: Self    Best call back number: [phone]     Patient is needing: PATIENT STATES THE SCOPE BIOPSIES CAME NEGATIVE AND THE THROAT BIOPSY CAME BACK NEGATIVE FOR CANCER.     PATIENT NEEDS TO KNOW WHERE TO GO FROM HERE ABOUT HER HERNIA. PATIENT WOULD LIKE TO GET A CALL BACK AT EARLIEST CONVENIENCE.

## 2024-03-01 NOTE — TELEPHONE ENCOUNTER
Per Christian, Can you please obtain more information regarding patient's hernia.  Depending on where the hernia is at we will determine who she will follow-up with.       Sent [employer]

## 2024-03-04 ENCOUNTER — OFFICE VISIT (OUTPATIENT)
Dept: FAMILY MEDICINE CLINIC | Facility: CLINIC | Age: 49
End: 2024-03-04
Payer: MEDICAID

## 2024-03-04 VITALS
BODY MASS INDEX: 25.13 KG/M2 | HEIGHT: 60 IN | SYSTOLIC BLOOD PRESSURE: 114 MMHG | WEIGHT: 128 LBS | OXYGEN SATURATION: 98 % | TEMPERATURE: 96.3 F | DIASTOLIC BLOOD PRESSURE: 72 MMHG | HEART RATE: 106 BPM

## 2024-03-04 DIAGNOSIS — K21.9 GASTROESOPHAGEAL REFLUX DISEASE WITHOUT ESOPHAGITIS: ICD-10-CM

## 2024-03-04 DIAGNOSIS — K59.00 CONSTIPATION, UNSPECIFIED CONSTIPATION TYPE: ICD-10-CM

## 2024-03-04 DIAGNOSIS — E03.9 HYPOTHYROIDISM, UNSPECIFIED TYPE: Primary | ICD-10-CM

## 2024-03-04 DIAGNOSIS — E78.5 HYPERLIPIDEMIA, UNSPECIFIED HYPERLIPIDEMIA TYPE: ICD-10-CM

## 2024-03-04 DIAGNOSIS — M25.50 POLYARTHRALGIA: ICD-10-CM

## 2024-03-04 RX ORDER — PANTOPRAZOLE SODIUM 40 MG/1
40 TABLET, DELAYED RELEASE ORAL DAILY
Qty: 90 TABLET | Refills: 0 | Status: SHIPPED | OUTPATIENT
Start: 2024-03-04

## 2024-03-04 RX ORDER — SUCRALFATE 1 G/1
1 TABLET ORAL EVERY 12 HOURS SCHEDULED
Qty: 60 TABLET | Refills: 0 | Status: SHIPPED | OUTPATIENT
Start: 2024-03-04

## 2024-03-04 RX ORDER — POLYETHYLENE GLYCOL 3350 17 G/17G
17 POWDER, FOR SOLUTION ORAL DAILY
Qty: 30 PACKET | Refills: 1 | Status: SHIPPED | OUTPATIENT
Start: 2024-03-04

## 2024-03-05 ENCOUNTER — LAB (OUTPATIENT)
Dept: LAB | Facility: HOSPITAL | Age: 49
End: 2024-03-05
Payer: MEDICAID

## 2024-03-05 ENCOUNTER — HOSPITAL ENCOUNTER (OUTPATIENT)
Dept: ULTRASOUND IMAGING | Facility: HOSPITAL | Age: 49
Discharge: HOME | End: 2024-03-05
Payer: MEDICAID

## 2024-03-05 DIAGNOSIS — E03.9 HYPOTHYROIDISM, UNSPECIFIED TYPE: ICD-10-CM

## 2024-03-05 DIAGNOSIS — R92.8 ABNORMAL MAMMOGRAM: ICD-10-CM

## 2024-03-05 DIAGNOSIS — E78.5 HYPERLIPIDEMIA, UNSPECIFIED HYPERLIPIDEMIA TYPE: ICD-10-CM

## 2024-03-05 DIAGNOSIS — M25.50 POLYARTHRALGIA: ICD-10-CM

## 2024-03-05 LAB
ALBUMIN SERPL-MCNC: 4.6 G/DL (ref 3.5–5.2)
ALBUMIN/GLOB SERPL: 1.7 G/DL
ALP SERPL-CCNC: 102 U/L (ref 39–117)
ALT SERPL W P-5'-P-CCNC: 11 U/L (ref 1–33)
ANION GAP SERPL CALCULATED.3IONS-SCNC: 11.2 MMOL/L (ref 5–15)
AST SERPL-CCNC: 12 U/L (ref 1–32)
BILIRUB SERPL-MCNC: 0.4 MG/DL (ref 0–1.2)
BUN SERPL-MCNC: 15 MG/DL (ref 6–20)
BUN/CREAT SERPL: 17.9 (ref 7–25)
CALCIUM SPEC-SCNC: 9.6 MG/DL (ref 8.6–10.5)
CHLORIDE SERPL-SCNC: 101 MMOL/L (ref 98–107)
CHOLEST SERPL-MCNC: 214 MG/DL (ref 0–200)
CO2 SERPL-SCNC: 24.8 MMOL/L (ref 22–29)
CREAT SERPL-MCNC: 0.84 MG/DL (ref 0.57–1)
EGFRCR SERPLBLD CKD-EPI 2021: 84.8 ML/MIN/1.73
GLOBULIN UR ELPH-MCNC: 2.7 GM/DL
GLUCOSE SERPL-MCNC: 101 MG/DL (ref 65–99)
HDLC SERPL-MCNC: 49 MG/DL (ref 40–60)
LDLC SERPL CALC-MCNC: 148 MG/DL (ref 0–100)
LDLC/HDLC SERPL: 2.98 {RATIO}
POTASSIUM SERPL-SCNC: 4.1 MMOL/L (ref 3.5–5.2)
PROT SERPL-MCNC: 7.3 G/DL (ref 6–8.5)
SODIUM SERPL-SCNC: 137 MMOL/L (ref 136–145)
TRIGL SERPL-MCNC: 96 MG/DL (ref 0–150)
TSH SERPL DL<=0.05 MIU/L-ACNC: 1.74 UIU/ML (ref 0.27–4.2)
VLDLC SERPL-MCNC: 17 MG/DL (ref 5–40)

## 2024-03-05 PROCEDURE — 86431 RHEUMATOID FACTOR QUANT: CPT

## 2024-03-05 PROCEDURE — 86200 CCP ANTIBODY: CPT

## 2024-03-05 PROCEDURE — 80061 LIPID PANEL: CPT

## 2024-03-05 PROCEDURE — 80050 GENERAL HEALTH PANEL: CPT

## 2024-03-05 PROCEDURE — 76642 ULTRASOUND BREAST LIMITED: CPT

## 2024-03-05 PROCEDURE — 36415 COLL VENOUS BLD VENIPUNCTURE: CPT

## 2024-03-06 LAB
CCP IGA+IGG SERPL IA-ACNC: 1 UNITS (ref 0–19)
DEPRECATED RDW RBC AUTO: 39.6 FL (ref 37–54)
ERYTHROCYTE [DISTWIDTH] IN BLOOD BY AUTOMATED COUNT: 12.6 % (ref 12.3–15.4)
HCT VFR BLD AUTO: 42.7 % (ref 34–46.6)
HGB BLD-MCNC: 14.5 G/DL (ref 12–15.9)
MCH RBC QN AUTO: 29.6 PG (ref 26.6–33)
MCHC RBC AUTO-ENTMCNC: 34 G/DL (ref 31.5–35.7)
MCV RBC AUTO: 87.1 FL (ref 79–97)
PLATELET # BLD AUTO: 332 10*3/MM3 (ref 140–450)
PMV BLD AUTO: 11.2 FL (ref 6–12)
RBC # BLD AUTO: 4.9 10*6/MM3 (ref 3.77–5.28)
RHEUMATOID FACT SERPL-ACNC: <10 IU/ML
WBC NRBC COR # BLD AUTO: 11.17 10*3/MM3 (ref 3.4–10.8)

## 2024-03-06 RX ORDER — SERTRALINE HYDROCHLORIDE 25 MG/1
25 TABLET, FILM COATED ORAL DAILY
Qty: 90 TABLET | Refills: 1 | Status: SHIPPED | OUTPATIENT
Start: 2024-03-06

## 2024-04-24 DIAGNOSIS — K44.9 HIATAL HERNIA: Primary | ICD-10-CM
